# Patient Record
Sex: MALE | Race: BLACK OR AFRICAN AMERICAN | Employment: STUDENT | URBAN - METROPOLITAN AREA
[De-identification: names, ages, dates, MRNs, and addresses within clinical notes are randomized per-mention and may not be internally consistent; named-entity substitution may affect disease eponyms.]

---

## 2017-03-30 ENCOUNTER — TELEPHONE (OUTPATIENT)
Dept: INFUSION THERAPY | Facility: CLINIC | Age: 27
End: 2017-03-30

## 2017-03-30 NOTE — TELEPHONE ENCOUNTER
Patient's mother left message on [facility] triage line asking for Darya House to follow up about testing and discussion with Dr. Hernandez. I sent a staff message to Darya House and left message with family that Darya is out until next week.

## 2017-04-24 ENCOUNTER — TELEPHONE (OUTPATIENT)
Dept: PEDIATRIC HEMATOLOGY/ONCOLOGY | Facility: CLINIC | Age: 27
End: 2017-04-24

## 2017-04-24 NOTE — TELEPHONE ENCOUNTER
E-mail from patient/caregiver:  -----Original Message-----  From: Guy [[email or web address]]   Sent: Friday, April 21, 2017 3:52 PM  To: Susan House  Subject: Yevgeniy Thornton     Dear    Darya Malik is Yevgeniy Thornton. I have a couple questions. 1. Do you happen to know when my next appointment is? 2. I was suppose to get another test done for my neuro-psych. How do I get that done. Please answer ASAP. I need another neuro-psych test done as soon as possible.  Thanks   Yevgeniy    Reply to patient/caregiver:  From: Susan House   Sent: Monday, April 24, 2017 4:46 PM  To: 'Guy'  Subject: RE: Yevgeniy ToddKenna     Hi Yevgeniy,     I think your mom left a message for me and I don't think I called her back - please tell her I m sorry about that.     Your next appointment with Dr. Hernandez is on Tuesday June 6th at 3:15 pm in the [facility] ([address] [place] [address]. 9th Floor   Phone: [phone]).     To schedule an appointment for follow-up neuropsych testing, you can call Dr. Radha Hammond directly: [phone]. If you get her voicemail, please leave a message for her to call you back. I sent Dr. Hammond a message letting her know that you ll be calling her to schedule follow-up testing, and that you d like to see her as soon as possible.    I will also try to call you.     Thank you,    Darya House RN, MS  Care Coordinator, Neurofibromatosis Program  [email or web address]   Clinic/Appointments: [phone]  Nurse Triage: [phone]  Pager: [phone]  Fax: [phone]  E-mail: [email or web address]?    Telephone call to patient/mother:  Called preferred phone number on file for patient; his mother, Shwetha Thornton, answered the phone.  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 Hernandez wanted him to have another evaluation prior to this transition. Yevgeniy has been accepted to 3 local schools: [employer], [employer] and [employer].   Mother stated that it's probably best to call her regarding appt scheduling, as she's the one who drives Yevgeniy to his appts.    Action/Plan:  Message sent to Radha Hammond, PhD, LP regarding patient's request for follow-up testing.     Susan House RN, MS  Neurofibromatosis Care Coordinator  Pager: [phone]                    Rafael Garcia,     You saw Guy for NP testing in 2012. He sent me an e-mail last Friday asking for follow-up testing - he said he needs it asap. He didn't give a reason for his sense of urgency, but I   I gave Yevgeniy your phone number to call you directly to schedule an appointment, but I also told him I would send you a message so you know he'll be calling.     His mom said it makes the most sense to call her to schedule the appt, since she's the one who drives him to his appts.   Shwetha's cell: [phone]    Thank you,   Darya House RN, MS  NF Care Coordinator  Clinic: [phone]  Triage: [phone]  Pager: [phone]  Fax: [phone]  Email: [email or web address]

## 2017-05-10 ENCOUNTER — OFFICE VISIT (OUTPATIENT)
Dept: NEUROPSYCHOLOGY | Facility: CLINIC | Age: 27
End: 2017-05-10

## 2017-05-10 DIAGNOSIS — Q85.01 NEUROFIBROMATOSIS, TYPE 1 (H): Primary | ICD-10-CM

## 2017-05-10 DIAGNOSIS — F09 MENTAL DISORDER DUE TO GENERAL MEDICAL CONDITION: ICD-10-CM

## 2017-05-10 NOTE — PROGRESS NOTES
The patient was seen for neuropsychological evaluation at the request of Dr. Baldomero Hernandez for the purposes of diagnostic clarification and treatment planning.  3 hours of face-to-face testing were provided by this writer.  Please see Dr. Radha Hammond's report for a full interpretation of the findings.

## 2017-05-10 NOTE — MR AVS SNAPSHOT
After Visit Summary   5/10/2017    Guy Thornton    MRN: [id number]           Patient Information     Date Of Birth          1990        Visit Information        Provider Department      5/10/2017 8:00 AM Radha Hammond, PhD [facility] Neuropsychology        Today's Diagnoses     Neurofibromatosis, type 1 (H)    -  1    Mental disorder due to general medical condition           Follow-ups after your visit        Your next 10 appointments already scheduled     Jun 06, 2017  3:15 PM CDT   Return Visit with Baldomero Hernandez MD   [facility] PEDS NEUR[facility]ROMATOSIS ([facility])    [facility]  9th Floor  [address] [address] [phone]              Who to contact     Please call your clinic at [phone] to:    Ask questions about your health    Make or cancel appointments    Discuss your medicines    Learn about your test results    Speak to your doctor   If you have compliments or concerns about an experience at your clinic, or if you wish to file a complaint, please contact [facility] Patient Relations at [phone] or email us at [email or web address]         Additional Information About Your Visit        MyChart Information     [employer] gives you secure access to your electronic health record. If you see a primary care provider, you can also send messages to your care team and make appointments. If you have questions, please call your primary care clinic.  If you do not have a primary care provider, please call [phone] and they will assist you.      [employer] is an electronic gateway that provides easy, online access to your medical records. With [employer], you can request a clinic appointment, read your test results, renew a prescription or communicate with your care team.     To access your existing account, please contact your [facility] or call [phone]  for assistance.        Care EveryWhere ID     This is your Care EveryWhere ID. This could be used by other organizations to access your [place] medical records  [id number]         Blood Pressure from Last 3 Encounters:   06/01/16 124/74   09/25/15 110/66   05/20/15 111/81    Weight from Last 3 Encounters:   06/01/16 66.2 kg (145 lb 15.1 oz)   09/25/15 64 kg (141 lb)   05/20/15 64.5 kg (142 lb 3.2 oz)              We Performed the Following     [id number] TESTING, PER HR/PSYCHOLOGIST     NEUROPSYCH TESTING BY [facility]        Primary Care Provider Office Phone # Fax #    Carlos Eduardo Stover [phone]       [facility] [address] [facility] [address]        Thank you!     Thank you for choosing [facility] NEUROPSYCHOLOGY  for your care. Our goal is always to provide you with excellent care. Hearing back from our patients is one way we can continue to improve our services. Please take a few minutes to complete the written survey that you may receive in the mail after your visit with us. Thank you!             Your Updated Medication List - Protect others around you: Learn how to safely use, store and throw away your medicines at www.disposemymeds.org.          This list is accurate as of: 5/10/17 11:59 PM.  Always use your most recent med list.                   Brand Name Dispense Instructions for use    * albuterol 108 (90 BASE) MCG/ACT Inhaler    PROAIR HFA/PROVENTIL HFA/VENTOLIN HFA    1 Inhaler    Inhale 2 puffs into the lungs every 4 hours as needed       * albuterol (2.5 MG/3ML) 0.083% neb solution     75 mL    Take 1 vial (2.5 mg) by nebulization every 6 hours as needed for shortness of breath / dyspnea or wheezing       * methylphenidate 5 MG tablet    RITALIN    60 tablet    Take 1 tablet (5 mg) by mouth 2 times daily in the AM and at lunchtime.       * methylphenidate 5 MG tablet    RITALIN    60 tablet    Take 1 tablet (5 mg) by mouth 2 times daily in the AM and at lunchtime.        * methylphenidate 5 MG tablet    RITALIN    60 tablet    Take 2 tablets (10 mg) by mouth 2 times daily In the AM and at lunchtime.       VITAMIN D PO      Unit amount unknown, takes one tablet PO Q day when remembers       * Notice:  This list has 5 medication(s) that are the same as other medications prescribed for you. Read the directions carefully, and ask your doctor or other care provider to review them with you.

## 2017-05-28 NOTE — PROGRESS NOTES
WAIS-IV    Raw              Age Scaled   Block Design       22   5     Similarities   23    9   Digit Span   19     5   Matrix Reasoning   6     3   Vocabulary   31    9   Arithmetic   11     8   Symbol Search   23    6   Visual Puzzles   7     5   Information   8     7   Coding   47     6   Letter Num. Seq.   19     9   Cancellation   19     3     VCI:  91  NOLAN:  79   WMI:  67  PSI:   75   FSIQ:  80       WIDE RANGE ACHIEVEMENT TEST - 4    Standard  %tile               Grade      Score  Rank                Equiv  Word Reading    74     4             4.6   Sentence Comp.    86     18           8.5   Spelling    86     18            7.2   Math Comp.    83     13             5.7   Reading Comp.   78     7             --     WECHSLER MEMORY SCALE - 3    Raw Score         SS   Information & Orientation  14   Logical Memory  Immed.  36   9   Logical Memory  30 min.  25   10   30 Minute Recognition  24   z=-0.44    STEPHANY COMPLEX FIGURE TEST   Raw Score   T-score %tile  Copy    28     --        <1   Immediate Recall    16.5    29     2   30 Minute Recall   15       25    1   Recognition   21   46     34     [place] VERBAL LEARNING TEST - 2     1            2         3           4         5   5   9   9   9   12    Raw Score             SD  Trials 1-5 Total  44   43   (T)   List A:  Trial 1  5    -1   List A: Trial 5  12   -0.5   List B: Total  5   -1   Short Delay Free Recall  7   -1.5   Short Delay Cued Recall  8   -2   Long Delay Free Recall  9   -1.5   Long Delay Cued Recall  9   -1.5   Semantic Clustering  4   -0.5   Serial Clustering  -0.1   -0.5   % Recall from Primacy  34   0.5   % Recall from Middle  41   0   % Recall from Recency  25   -0.5   Total Learning [place]  1.4   0   Across-Trial Consistency  78   -0.5   Total Repetitions  9   1   Total Intrusions  2   0   Total Recall Discrim  1.8   -1   Recognition Hits  16   0.5   Recognition False Pos  9   3   Recognition Discrim  2.4   -1   Response  Bias  -0.6   -3.5     [place] NAMING TEST  Score     48         T 35   -1.5 %ile  [  47 w/o cues  4 w/phonemic cues]    CONTROLLED ORAL WORD ASSOC TEST  Score     13              z-2.83   <1 %ile    ANIMAL FLUENCY  Score     13              z     -1.65       5      %ile    CLOCK DRAWING  Command  2 /3    Copy   3   /3       TRAIL MAKING TEST         Seconds         Errors              z                    %ile  A    34   0   -0.69  . 25   B    99   0   -2.53  . <1     [place] CARD SORTING TEST  # Categories  4             6-10    %ile  % persev err.         17        T      38           12 %ile    PSYCHOMOTOR TESTS                                        RH                  LH  Grooved Pegboard   93    T    28   113   T   28                                   Drops (  )                  Drops (  )    TEST OF SUSTAINED ATTENTION AND TRACKING   Time  156     Errors  2     WURS:  19  ARIAS DEPRESSION INVENTORY - 2:  1

## 2017-05-28 NOTE — PROGRESS NOTES
NAME: Guy Thornton  MR#: [id number]  YOB: 1990  DATE OF EXAM: 5/10/2017    [facility]  [facility]  [address]  [place]  [address] [phone]    NEUROPSYCHOLOGICAL EVALUATION    RELEVANT HISTORY AND REASON FOR REFERRAL    Guy Thornton is a 26 year old, right handed student with 14 years of formal education. Information was obtained via interview with the patient and his mother, and review of his medical records, including a prior neuropsychological evaluation. 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 He had multiple developmental delays early in life requiring OT, PT, and speech. He did not walk until he was [age] years old. He was diagnosed with neurofibromatosis 1 by a neurologist at 18 to 24 months of age because of delayed developmental milestones, axillary and inguinal freckling, and multiple café au lait lesions. He had learning issues throughout school and had an IEP for assistance with his learning needs. He is planning to transition from community college to a four-year college this fall. He was referred for neuropsychological evaluation by Joe Hernandez M.D., for further categorization of any cognitive difficulties, and to evaluate mood.    Prior records were available for review. A speech-language pathology communication evaluation from 05/09/2002 under the direction of Damari Chase MA, CCC was reviewed.  Results of that evaluation indicated mildly delayed articulatory abilities, moderately delayed receptive and expressive language skills, pragmatic concerns regarding conversational skills and socially appropriate behaviors and voice concerns characterized by a hoarse voice, vocal quality, low pitch and mixed hearing loss. Strengths at that time  included excellent expressive vocabulary and positive disposition.  A letter from his neurologist in [place], CRISTOBAL Fuentes MD, dated 01/23/2012 indicated that he had significant academic difficulties and had an IEP from elementary school all the way through high school.  He had always had an IEP because of his learning disabilities, with significant problems with fine motor abilities and increased tone.  He has had significant problems with his penmanship and problems with his visual processing.  He does much better with oral exams and has problems with bubbling in [employer] test sheets.  He was reported to need a scribe to help him with his exams in completing his exams and he needed extra time for testing.  Given slowed reading speed, it was also thought that he would benefit from audio books.    Mr. Thornton underwent a neuropsychological evaluation under my direction on 11/9/2012. Please refer to the report from that date for full details regarding his history and the findings of the evaluation. Briefly, results indicated overall intellectual functioning falling in the borderline range (WAIS FSIQ = 75), with a significant difference between verbal abilities falling in the average range, and nonverbal abilities, falling in the mildly impaired range. Impairments were noted in complex attentional processing, information and psychomotor processing speed, visual processing, and executive functioning. Language abilities, in contrast, reflected a significant strength overall. Verbal learning and memory fell within normal limits. He denied experiencing significant depressive symptomatology.    CLINICAL INTERVIEW FINDINGS    Upon interview, Mr. Thornton and his mother indicated that since his last evaluation, he has been attending [employer], studying theater, and in fact will be earning his AFA later today. He is thinking of attending [employer] in the [employer], [employer], or [employer] starting later this summer. He stated that he did fairly well in school, earning A's and B's. His mother noted that as a child, he did not often get in trouble at school, but he did have difficulty sitting still. His teachers allowed him to stand up when he needed to. He tended to chew on things, so he was allowed to chew gum at school. He tended to procrastinate. He had academic accommodations including extra time on tests, books on tape, a notetaker, and a scribe since he was not able to stay within the lines on [employer] forms. 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    Mr. Thornton does not believe that he has had any changes in cognition. At times he may forget what others have said, and he sometimes misplaces items. He continues to have difficulty with attention, and has been prescribed a medication for ADHD, which has helped.  He denied difficulty with word finding, comprehension, decision-making, or organization. He lives with his mother, who for the most part manages his finances. He has a checking account and a savings account, and has not always been following through on recording transactions in his register, so his mother is working with him. His mother assists with management of his medications. He has had a 's permit for the last three years, but has never been interested in a  s license. He cooks without difficulty and handles his personal cares independently.    Mr. Thornton has a history of anxiety, but is not currently working with a psychologist or psychiatrist. He described his mood as happy. He is irritable only when he is hungry. He has been sleeping well, 7 to 8 hours a night, and generally feels rested once he gets up in the morning. He has not been napping during the day. His appetite has been good and he has not had any recent weight changes. His interest level is good. His motivation can be low for tasks that he does not want to do. His mother described him as  always itinerary focused, and he wants to be exactly on time. He does a lot of pacing, and runs two or three times a day, for 1 to 2 miles each time. He denied suicidal ideation or any history of attempts to commit suicide. He denied visual or auditory hallucinations. He denied alcohol, tobacco, or illicit drug use.    Mr. Thornton denied any history of seizure, stroke, or head injury resulting loss of consciousness. His balance has never been good. He has problems standing on one foot. He denied unilateral weakness or numbness. He denied tremor. He experiences occasional headaches. His shoulders have been bothering him and are stiff. He has cataracts, which his doctors are watching closely.    PAST MEDICAL HISTORY: Records indicate a history of acquired bilateral deformity of ankles, deformity of toes, hyperlipidemia, hearing loss, intermittent asthma, acne, and neurofibromatosis 1.    CURRENT MEDICATIONS: Include methylphenidate, albuterol, and cholecalciferol.    FAMILY MEDICAL HISTORY:  Notable for a biological mother with borderline personality disorder and younger sister who is also premature and has been diagnosed with problems with reflexes and dizziness, but apparently does not have neurofibromatosis.    BEHAVIORAL OBSERVATIONS    During the evaluation, Mr. Thornton was pleasant, cooperative, and seemed to understand the instructions. He was alert and oriented to person, place, and time. No abnormal movements were observed clinically, other than that he bent very close to the table during writing and drawing tasks. Mood was euthymic. At times he commented that he was nervous, however. Speech was fluent, with normal articulation, volume, and rate. Spontaneous conversation was present and appropriate. Mr. Thornton appeared to put forth consistent effort, and the results are believed to accurately reflect his current functioning.    MEASURES ADMINISTERED    The following measures were administered by a trained  psychometrist, under the direct supervision of a licensed psychologist.    Wechsler Adult Intelligence Scale-4; Wide Range Achievement Test-4; subtests of the Wechsler Memory Scale-3; Michael Complex Figure Test; [place] Verbal Learning Test-2; [place] Naming Test; Controlled Oral Word Association Test; Semantic Fluency; Clock Drawing; Trail Making Test; [place] Card Sorting Test; Grooved Pegboard; Test of Sustained Attention and Tracking; Wender [place] Rating Scale; De Souza Depression Inventory - 2 (BDI-2).     RESULTS AND INTERPRETATION    Overall intellectual functioning fell in the low average range (WAIS-IV FSIQ = 80). Examination of the factors underlying this performance indicate verbal abilities following the average range (VCI = 91), with nonverbal abilities falling in the borderline range (NOLAN = 79), a marginal difference. Working memory fell in the mildly impaired range (WMI = 67), and processing speed fell in the borderline range (PSI = 75).    Single word reading abilities fell in the borderline range, at a fourth grade equivalent. Comprehension of sentences was low average, falling at an eighth grade equivalent. Spelling and mathematical abilities also fell in the low average range.    Confrontation naming was mildly impaired for his age and level of education, and improved marginally with phonemic cues. General fund of verbal knowledge was low average. Verbal abstract reasoning was average. Expressive vocabulary was average. Letter fluency was moderately slowed. Semantic fluency was mildly slowed.    Attention span was mildly impaired for his age. Mental arithmetic was low average. Divided attention was moderately impaired on a timed, visual motor sequencing task, but notably, was average on an untimed, auditory sequencing task. Performance on a measure of sustained attention was moderately impaired. Psychomotor processing speed was mildly slowed. Visual search and scanning was mildly slowed. Finger  "tapping speed was moderately impaired bilaterally.    Construction of a clock to command was notable for difficulty with conceptualization. When asked to set the hands to 11:10, he instead set them to 10:55. Copy of a clock fell within normal limits. Construction of a complex design was moderately impaired, and was characterized by missing details, and difficulty with planning and organization. Assembly of visual material was mildly impaired. Visual problem-solving was mildly impaired. Nonverbal deductive reasoning was moderately impaired.    Novel problem-solving, including the ability to generate strategies and solutions, was mildly impaired for his age and level of education. On this task, he demonstrated mild difficulty with conceptualization.    Immediate and 30 minute delayed recall of verbal narrative material was average. On a multiple trial list learning task, immediate recall was average, with mildly impaired recall following a 20 minute delay. Recognition memory on this task was low average overall, but was notable for multiple false positive identifications of words. Immediate and 30 minute delayed recall of visual material was moderately impaired, with average recognition memory on this task.    On the WURS, a self-report questionnaire, Mr. Thornton scored below a cutoff suggestive of childhood symptoms of ADHD. Specifically, under  very much,  he indicated that as a child, he was nervous and fidgety, acted without thinking and was impulsive, and had trouble with math or numbers. Under \"quite a bit,\" he indicated that he had concentration problems and was easily distracted, and was inattentive. Under  mildly,  he indicated that he had temper outbursts or tantrums.    On the BDI-2, self-report questionnaire, Mr. Thornton denied experiencing depressive symptomatology, acknowledging only that he feels more restless or wound up than usual.    IMPRESSIONS AND RECOMMENDATIONS    Current results indicate overall " intellectual functioning falling in the low average range (WAIS-IV FSIQ = 80), with a marginal difference between verbal abilities, falling in the average range (VCI = 91), and nonverbal abilities, falling in the borderline range (NOLAN = 79). Working memory was mildly impaired, and processing speed was borderline impaired. Attention was impaired overall, although there was some variability, ranging from moderately impaired on several tasks to average on one task. There was mild executive dysfunction, including difficulty with problem solving, planning, organization, and conceptualization. Visual processing, including visual construction and visuospatial abilities, was impaired. He had difficulty with retrieval of information, but did best when information was presented in context. Motor functioning was impaired bilaterally, and processing speed was slowed. Language abilities continue to reflect a strength, in many cases falling in the average range. Basic academic abilities, including single word reading abilities, sentence comprehension, spelling, and mathematical computation fell in the borderline to low average range. He denied significant depressive symptomatology.    Compared to his evaluation on 11/9/2012, he has had declines in working memory and information processing speed. He has had improvements in some aspects of attention. Performance otherwise remains stable across cognitive domains.    This pattern of performance continues to suggest diffuse cerebral involvement, with a suggestion of greater nondominant hemisphere cerebral involvement. This pattern has been generally stable over the last five years, and is consistent with narrative descriptions of evaluations from childhood which were available for review. The findings are therefore consistent with the lifelong developmental disorder. On a self-report questionnaire, his score fell below a cutoff suggestive of ADHD. His mother indicated, however, that  he had trouble sitting still as a child and as an adult, he had trouble paying attention and tended to procrastinate. Although not formally evaluated for ADHD, on this evaluation, he demonstrated difficulty with attention as well as executive functioning, findings which are consistent with ADHD, a common comorbidity of NF1.     In terms academic functioning, Mr. Thornton will likely continue to benefit from academic accommodations which have been provided to him throughout his academic career. These could include, but may not be limited to extended time to complete assignments and examinations, given his slowed processing speed, as well as a  in view of impaired fine manual dexterity and slowed processing speed. As noted at his prior evaluation, he will with likely do particularly well with classes that emphasizes strengths in language abilities and verbal memory. He has a particular interest in theater, which would capitalize on these strengths. As recommended at his prior evaluation, a reduced course load seems reasonable so he does not become overwhelmed with completing his assignments, given slowed processing speed and other cognitive difficulties.    In terms of daily functioning, Mr. Thornton may find that he has difficulty organizing large, complex tasks. Others may assist by breaking down such tasks into smaller, more manageable parts. His mother has been assisting him with his finances, and he may benefit from some specific training in methods to budget and manage finances. He will likely do well with structure and routine. If it takes him longer to complete tasks, he may find it helpful to provide himself with ample time so that he does not become overwhelmed and work less efficiently. In general, he will likely learn information best when it is presented verbally, and also when it is presented in context. These results have not been discussed with Mr. Thornton or his family, although he was  encouraged to contact my office to schedule appointment for feedback should he so desire.      Radha Hammond, Ph.D., ABPP  Licensed Psychologist, [id number]  Board Certified in Clinical Neuropsychology    Time spent:  Four hours professional time, including interview, record review, data integration, and report writing (CPT 96366); an additional three hours, including testing administered by a psychometrist and interpreted by a neuropsychologist (CPT 55516). ICD-10 diagnosis:  Q85.01; F06.8.

## 2017-06-06 ENCOUNTER — OFFICE VISIT (OUTPATIENT)
Dept: PEDIATRIC HEMATOLOGY/ONCOLOGY | Facility: CLINIC | Age: 27
End: 2017-06-06
Attending: PEDIATRICS
Payer: COMMERCIAL

## 2017-06-06 VITALS
DIASTOLIC BLOOD PRESSURE: 84 MMHG | SYSTOLIC BLOOD PRESSURE: 130 MMHG | RESPIRATION RATE: 16 BRPM | BODY MASS INDEX: 23.29 KG/M2 | HEIGHT: 67 IN | HEART RATE: 96 BPM | WEIGHT: 148.37 LBS

## 2017-06-06 DIAGNOSIS — Q85.01 NEUROFIBROMATOSIS, TYPE 1 (VON RECKLINGHAUSEN'S DISEASE) (H): Primary | ICD-10-CM

## 2017-06-06 DIAGNOSIS — H26.9: ICD-10-CM

## 2017-06-06 DIAGNOSIS — J45.20 INTERMITTENT ASTHMA, UNCOMPLICATED: ICD-10-CM

## 2017-06-06 PROCEDURE — 99213 OFFICE O/P EST LOW 20 MIN: CPT | Mod: ZF

## 2017-06-06 ASSESSMENT — PAIN SCALES - GENERAL: PAINLEVEL: MODERATE PAIN (5)

## 2017-06-06 NOTE — MR AVS SNAPSHOT
After Visit Summary   6/6/2017    Guy Thornton    MRN: [id number]           Patient Information     Date Of Birth          1990        Visit Information        Provider Department      6/6/2017 3:15 PM Baldomero Hernandez MD [facility] PEDS NEUROFIBROMATOSIS        Today's Diagnoses     Neurofibromatosis, type 1 (von Recklinghausen's disease) (H)    -  1    Cataract, anterior subcapsular polar nonsenile; left        Intermittent asthma, uncomplicated          Care Instructions    Neurofibromatosis (NF) Clinic  [facility], 9th Floor - [address] [address]  Scheduling/Appointments: [phone]  Fax: [phone]    Numbers to call:   Monday - Friday, 8:00 am - 5:00 pm:    Non-urgent or same-day call-back concerns: [facility] Nurse Triage - Voicemail: [phone]    Urgent concerns: NF Care Coordinator - Darya House RN - Pager: [phone]    Scheduling/Appointments: [phone]  Nights and weekends:   Call [phone] and ask the  to page the 'Pediatric Heme/Onc fellow on call' if you have an urgent concern that can't wait until the clinic opens.  Genetic Counselors:  Alycia Valencia: [phone]   Blanca Combs: [phone]    Darya House RN, MS  NF Care Coordinator  Pager: [phone]  E-mail: [email or web address]    --------------------------------------------------------------------------------------------------------------------------------    It was a pleasure to see you in our Neurofibromatosis clinic today.    Here's our recommendations for follow-up care:    Referrals:  Ophthalmology - Dr. Barr at  [facility]    Other Instructions:  Cervical spine MRI - within the month.    Return to Clinic:  After MRI to review results.            Follow-ups after your visit        Additional Services     OPHTHALMOLOGY ADULT REFERRAL       Your provider has referred you to:  [facility]: [facility] [phone]   [email or web address]  Dr. Barr - follow-up NF1 and cataracts.    Please be aware that coverage of these services is subject to the terms and limitations of your health insurance plan.  Call member services at your health plan with any benefit or coverage questions.      Please bring the following to your appointment:  >>   Any x-rays, CTs or MRIs which have been performed.  Contact the facility where they were done to arrange for  prior to your scheduled appointment.  Any new CT, MRI or other procedures ordered by your specialist must be performed at a [facility] or coordinated by your clinic's referral office.    >>   List of current medications   >>   This referral request   >>   Any documents/labs given to you for this referral                  Follow-up notes from your care team     Return in about 2 weeks (around 6/21/2017) for Imaging, NF1 Follow-Up, Hem/Onc Clinic.      Your next 10 appointments already scheduled     Jun 28, 2017  7:00 AM CDT   MR CERVICAL SPINE W/O & W CONTRAST with URMR2   [facility], MRI ([facility])    [address] [place] [address] [phone]           Take your medicines as usual, unless your doctor tells you not to. Bring a list of your current medicines to your exam (including vitamins, minerals and over-the-counter drugs).  You will be given intravenous contrast for this exam. To prepare:   The day before your exam, drink extra fluids at least six 8-ounce glasses (unless your doctor tells you to restrict your fluids).   Have a blood test (creatinine test) within 30 days of your exam. Go to your clinic or Diagnostic Imaging Department for this test.  The MRI machine uses a strong magnet. Please wear clothes without metal (snaps, zippers). A sweatsuit works well, or we may give you a hospital gown.  Please remove any body piercings and hair extensions  before you arrive. You will also remove watches, jewelry, hairpins, wallets, dentures, partial dental plates and hearing aids. You may wear contact lenses, and you may be able to wear your rings. We have a safe place to keep your personal items, but it is safer to leave them at home.   **IMPORTANT** THE INSTRUCTIONS BELOW ARE ONLY FOR THOSE PATIENTS WHO HAVE BEEN TOLD THEY WILL RECEIVE SEDATION OR GENERAL ANESTHESIA DURING THEIR MRI PROCEDURE:  IF YOU WILL RECEIVE SEDATION (take medicine to help you relax during your exam):   You must get the medicine from your doctor before you arrive. Bring the medicine to the exam. Do not take it at home.   Arrive one hour early. Bring someone who can take you home after the test. Your medicine will make you sleepy. After the exam, you may not drive, take a bus or take a taxi by yourself.   No eating 8 hours before your exam. You may have clear liquids up until 4 hours before your exam. (Clear liquids include water, clear tea, black coffee and fruit juice without pulp.)  IF YOU WILL RECEIVE ANESTHESIA (be asleep for your exam):   Arrive 1 1/2 hours early. Bring someone who can take you home after the test. You may not drive, take a bus or take a taxi by yourself.   No eating 8 hours before your exam. You may have clear liquids up until 4 hours before your exam. (Clear liquids include water, clear tea, black coffee and fruit juice without pulp.)  Please call the Imaging Department at your exam site with any questions.            Jun 28, 2017  9:00 AM CDT   Return Visit with Baldomero Hernandez MD   Peds Hematology Oncology ([facility])    [facility]  9th Floor  [address] [facility] [address] [phone]              Who to contact     Please call your clinic at [phone] to:    Ask questions about your health    Make or cancel appointments    Discuss your medicines    Learn about your test results    Speak to your doctor   If you  "have compliments or concerns about an experience at your clinic, or if you wish to file a complaint, please contact [facility] Patient Relations at [phone] or email us at [email or web address]         Additional Information About Your Visit        MyChart Information     [employer] gives you secure access to your electronic health record. If you see a primary care provider, you can also send messages to your care team and make appointments. If you have questions, please call your primary care clinic.  If you do not have a primary care provider, please call [phone] and they will assist you.      [facility] is an electronic gateway that provides easy, online access to your medical records. With [facility], you can request a clinic appointment, read your test results, renew a prescription or communicate with your care team.     To access your existing account, please contact your [facility] or call [phone] for assistance.        Care EveryWhere ID     This is your Care EveryWhere ID. This could be used by other organizations to access your [place] medical records  [id number]        Your Vitals Were     Pulse Respirations Height BMI (Body Mass Index)          96 16 1.703 m (5' 7.05\") 23.21 kg/m2         Blood Pressure from Last 3 Encounters:   06/06/17 130/84   06/01/16 124/74   09/25/15 110/66    Weight from Last 3 Encounters:   06/06/17 67.3 kg (148 lb 5.9 oz)   06/01/16 66.2 kg (145 lb 15.1 oz)   09/25/15 64 kg (141 lb)               Primary Care Provider Office Phone # Fax #    Sonny Amandeep Lr [phone]       [address] [address]        Thank you!     [facility]  [facility] your [facility]. Our goal is always to provide you with excellent care. Hearing back from our patients is one way we can continue to improve our services. Please take a few minutes to " complete the written survey that you may receive in the mail after your visit with us. Thank you!             Your Updated Medication List - Protect others around you: Learn how to safely use, store and throw away your medicines at www.disposemymeds.org.          This list is accurate as of: 6/6/17 11:59 PM.  Always use your most recent med list.                   Brand Name Dispense Instructions for use    * albuterol 108 (90 BASE) MCG/ACT Inhaler    PROAIR HFA/PROVENTIL HFA/VENTOLIN HFA    1 Inhaler    Inhale 2 puffs into the lungs every 4 hours as needed       * albuterol (2.5 MG/3ML) 0.083% neb solution     75 mL    Take 1 vial (2.5 mg) by nebulization every 6 hours as needed for shortness of breath / dyspnea or wheezing       * methylphenidate 5 MG tablet    RITALIN    60 tablet    Take 1 tablet (5 mg) by mouth 2 times daily in the AM and at lunchtime.       * methylphenidate 5 MG tablet    RITALIN    60 tablet    Take 1 tablet (5 mg) by mouth 2 times daily in the AM and at lunchtime.       * methylphenidate 5 MG tablet    RITALIN    60 tablet    Take 2 tablets (10 mg) by mouth 2 times daily In the AM and at lunchtime.       VITAMIN D PO      Unit amount unknown, takes one tablet PO Q day when remembers       * Notice:  This list has 5 medication(s) that are the same as other medications prescribed for you. Read the directions carefully, and ask your doctor or other care provider to review them with you.

## 2017-06-06 NOTE — LETTER
"  6/6/2017      RE: Guy Thornton  [address]  [place] [address]            Pediatric Hematology/Oncology Clinic Note     HPI-  Guy Thornton is a 26 year old male with NF who presents to the clinic with mom for a follow up.  Guy just graduated from high school with honors and is going to [employer] for college.He is going to take a [employer] language class.   Yevgeniy got a causey retriever puppy recently.  Yevgeniy has had a neuropsychological testing done and wanted to review the results. Working memory and processing speed was found to be borderline, He has good verbal memory and language abilities.  Guy endorses shoulder pain        History was obtained from Guy and his mom.       Allergies   Allergen Reactions     Bacitracin Blisters     Chloral Hydrate Other (See Comments)     Patient becomes aggressive and hallucinations       Current Outpatient Prescriptions   Medication     methylphenidate (RITALIN) 5 MG tablet     albuterol (2.5 MG/3ML) 0.083% nebulizer solution     albuterol (PROAIR HFA, PROVENTIL HFA, VENTOLIN HFA) 108 (90 BASE) MCG/ACT inhaler     methylphenidate (RITALIN) 5 MG tablet     methylphenidate (RITALIN) 5 MG tablet     Cholecalciferol (VITAMIN D PO)     No current facility-administered medications for this visit.        Past Medical History:   Diagnosis Date     Asthma      Hearing loss of left ear      Neurofibromatosis (H)        Past Surgical History:   Procedure Laterality Date     BACK SURGERY       ear surgeries      3 tympanomastoidectomies       Family History   Problem Relation Age of Onset     Unknown/Adopted Mother      Unknown/Adopted Father      Unknown/Adopted Maternal Grandmother      Unknown/Adopted Maternal Grandfather        Review of Systems    /84 (BP Location: Left arm, Patient Position: Chair, Cuff Size: Adult Regular)  Pulse 96  Resp 16  Ht 1.703 m (5' 7.05\")  Wt 67.3 kg (148 lb 5.9 oz)  BMI 23.21 kg/m2  Physical Exam      Results for " orders placed or performed in visit on 06/01/16   XR Spine Complete 2 Views    Narrative    Exam: Complete spine radiograph on 6/1/2016.    History: Rule out scoliosis, NF1.    Comparison: 9/21/2012.    Findings: AP and lateral views of the entire spine were obtained.    There is subtle S. Shaped scoliosis of the thoracic and lumbar spine,  levocurvature in the thoracic spine and dextrocurvature in the lumbar  spine measuring less than 10 degrees. There is mild pelvic tilt with  the right iliac crest slightly higher than the left. There is minimal  rightward coronal imbalance. There is is mild negative sagittal  imbalance. No vertebral body anomaly is identified. Straightening of  the cervical lordosis, which may be positional. Normal thoracic  kyphosis and mild exaggerated lumbar lordosis.     Cardiac silhouette and pulmonary vasculature are within normal limits.  No pneumothorax, pleural effusion or focal airspace opacities.  Nonobstructive bowel gas pattern.      Impression    Impression:  Mild S. shaped scoliotic curvature of the thoracolumbar  spine, similar to prior. Negative sagittal imbalance, slightly  progressed since prior study. Minimal pelvic tilt, not significant  changed.    I have personally reviewed the examination and initial interpretation  and I agree with the findings.    BIANCA CHRISTIANSON MD         Impression:  1. NF  2. Shoulder pain    Plan:  1. MRI brachial plexus  2. ***    Time spent with patient 40 minutes.    This document serves as a record of the services and decisions personally performed and made by Baldomero Hernandez MD. It was created on his behalf by a medical student..    The documentation recorded by the medical student accurately reflects the services I personally performed and the decisions made by me.      Baldomero Hernandez    CC  Patient Care Team:  Sonny Lr as PCP - General (Internal Medicine)  Susan House, RN as Continuity Care Coordinator  (Pediatric Hematology/Oncology)  Baldomero Hernandez MD as Referring Physician (Pediatric Hematology/Oncology)  Daphne Egan APRN CNP as Nurse Practitioner (Pediatric Hematology/Oncology)  Radha Hammond, PhD LP (Psychology)  DUSTIN MCKNIGHT    Copy to patient  GUY PATINO  [address]  [address]  [place] [address]       Pediatric Hematology/Oncology Clinic Note    CC: Guy Patino is a 26 year old male with NF1 who presents to clinic with his mother for a follow up evaluation.    HPI: Guy reports that he is doing well overall. He just graduated from [employer] with honors and is going to [employer] in [place]. He will also be taking a sign language class.  Yevgeniy recently got a causey retriever puppy.  Yevgeniy has had a neuropsychological evaluation done and wanted to review the results. Working memory and processing speed was found to be borderline, he has a good verbal memory and language abilities.   Yevgeniy complains about shoulder pain.       Fam/Soc. No new family history. Yevgeniy is attending [employer] and doing well.    History was obtained from Guy and his mother.    Allergies as of 06/06/2017 - Noel as Reviewed 06/06/2017   Allergen Reaction Noted     Bacitracin Blisters 09/21/2012     Chloral hydrate Other (See Comments) 09/21/2012       Current Outpatient Prescriptions   Medication Sig Dispense Refill     methylphenidate (RITALIN) 5 MG tablet Take 2 tablets (10 mg) by mouth 2 times daily In the AM and at lunchtime. 60 tablet 0     albuterol (2.5 MG/3ML) 0.083% nebulizer solution Take 1 vial (2.5 mg) by nebulization every 6 hours as needed for shortness of breath / dyspnea or wheezing 75 mL 0     albuterol (PROAIR HFA, PROVENTIL HFA, VENTOLIN HFA) 108 (90 BASE) MCG/ACT inhaler Inhale 2 puffs into the lungs every 4 hours as needed 1 Inhaler 2     methylphenidate (RITALIN) 5 MG tablet Take 1 tablet (5 mg) by mouth 2 times daily in the  "AM and at lunchtime. 60 tablet 0     methylphenidate (RITALIN) 5 MG tablet Take 1 tablet (5 mg) by mouth 2 times daily in the AM and at lunchtime. 60 tablet 0     Cholecalciferol (VITAMIN D PO) Unit amount unknown, takes one tablet PO Q day when remembers         Past Medical History:   Diagnosis Date     Asthma      Hearing loss of left ear      Neurofibromatosis (H)        Social History     Social History     Marital status: Single     Spouse name: N/A     Number of children: N/A     Years of education: N/A     Occupational History     Not on file.     Social History Main Topics     Smoking status: Never Smoker     Smokeless tobacco: Never Used     Alcohol use No     Drug use: No     Sexual activity: No     Other Topics Concern     Blood Transfusions No     Sleep Concern No     Stress Concern No     Weight Concern No     Exercise Yes     Bike Helmet Yes     Seat Belt Yes     Social History Narrative    Lives at home with mother. Goes to [employer] for college, majoring in theatre.        Family History   Problem Relation Age of Onset     Unknown/Adopted Mother      Unknown/Adopted Father      Unknown/Adopted Maternal Grandmother      Unknown/Adopted Maternal Grandfather        Review of Systems   Constitutional: Negative.    HENT: Negative.    Eyes: Negative.    Respiratory: Negative.    Cardiovascular: Negative.    Gastrointestinal: Negative.    Genitourinary: Negative.    Musculoskeletal: Negative.    Skin:        Cafe au lait macules   Neurological: Negative.    Endo/Heme/Allergies: Negative.    Psychiatric/Behavioral: Negative.      See HPI. Complete ROS otherwise negative.    /84 (BP Location: Left arm, Patient Position: Chair, Cuff Size: Adult Regular)  Pulse 96  Resp 16  Ht 1.703 m (5' 7.05\")  Wt 67.3 kg (148 lb 5.9 oz)  BMI 23.21 kg/m2  Physical Exam   Constitutional: He is oriented to person, place, and time and well-developed, well-nourished, and in no distress.   HENT:   Mouth/Throat: " Oropharynx is clear and moist.   Eyes: Conjunctivae and EOM are normal.   Neck: Normal range of motion.   Cardiovascular: Normal rate, regular rhythm and normal heart sounds.    Pulmonary/Chest: Effort normal and breath sounds normal.   Abdominal: Soft.   Musculoskeletal: Normal range of motion. He exhibits no edema.   Neurological: He is alert and oriented to person, place, and time.   Negative Romberg.    Skin: Skin is warm and dry.   Psychiatric: Mood and affect normal.       Impression:  1. NF1  2. Left cataract - not NF1 related  3. Shoulder and neck pain      Plan:    1. MRI cervical spine/ brachial plexus  2. RTC for review of imaging.      Baldomero Hernandez MD

## 2017-06-06 NOTE — LETTER
6/6/2017      RE: Guy Thornton  [address]  [address]  [place] [address]          Pediatric Hematology/Oncology Clinic Note    CC: Guy Thornton is a 26 year old male with NF1 who presents to clinic with his mother for a follow up evaluation.    HPI: Guy reports that he is doing well overall. He just graduated from [employer] with honors and is going to [employer] in [place]. He will also be taking a sign language class.  Yevgeniy recently got a causey retriever puppy.  Yevgeniy has had a neuropsychological evaluation done and wanted to review the results. Working memory and processing speed was found to be borderline, he has a good verbal memory and language abilities.   Yevgeniy complains about shoulder pain.       Fam/Soc. No new family history. Yevgeniy is attending [employer] and doing well.    History was obtained from Guy and his mother.    Allergies as of 06/06/2017 - Noel as Reviewed 06/06/2017   Allergen Reaction Noted     Bacitracin Blisters 09/21/2012     Chloral hydrate Other (See Comments) 09/21/2012       Current Outpatient Prescriptions   Medication Sig Dispense Refill     methylphenidate (RITALIN) 5 MG tablet Take 2 tablets (10 mg) by mouth 2 times daily In the AM and at lunchtime. 60 tablet 0     albuterol (2.5 MG/3ML) 0.083% nebulizer solution Take 1 vial (2.5 mg) by nebulization every 6 hours as needed for shortness of breath / dyspnea or wheezing 75 mL 0     albuterol (PROAIR HFA, PROVENTIL HFA, VENTOLIN HFA) 108 (90 BASE) MCG/ACT inhaler Inhale 2 puffs into the lungs every 4 hours as needed 1 Inhaler 2     methylphenidate (RITALIN) 5 MG tablet Take 1 tablet (5 mg) by mouth 2 times daily in the AM and at lunchtime. 60 tablet 0     methylphenidate (RITALIN) 5 MG tablet Take 1 tablet (5 mg) by mouth 2 times daily in the AM and at lunchtime. 60 tablet 0     Cholecalciferol (VITAMIN D PO) Unit amount unknown, takes one tablet PO Q day when remembers    "      Past Medical History:   Diagnosis Date     Asthma      Hearing loss of left ear      Neurofibromatosis (H)        Social History     Social History     Marital status: Single     Spouse name: N/A     Number of children: N/A     Years of education: N/A     Occupational History     Not on file.     Social History Main Topics     Smoking status: Never Smoker     Smokeless tobacco: Never Used     Alcohol use No     Drug use: No     Sexual activity: No     Other Topics Concern     Blood Transfusions No     Sleep Concern No     Stress Concern No     Weight Concern No     Exercise Yes     Bike Helmet Yes     Seat Belt Yes     Social History Narrative    Lives at home with mother. Goes to [email or web address] for college, majoring in [employer].        Family History   Problem Relation Age of Onset     Unknown/Adopted Mother      Unknown/Adopted Father      Unknown/Adopted Maternal Grandmother      Unknown/Adopted Maternal Grandfather        Review of Systems   Constitutional: Negative.    HENT: Negative.    Eyes: Negative.    Respiratory: Negative.    Cardiovascular: Negative.    Gastrointestinal: Negative.    Genitourinary: Negative.    Musculoskeletal: Negative.    Skin:        Cafe au lait macules   Neurological: Negative.    Endo/Heme/Allergies: Negative.    Psychiatric/Behavioral: Negative.      See HPI. Complete ROS otherwise negative.    /84 (BP Location: Left arm, Patient Position: Chair, Cuff Size: Adult Regular)  Pulse 96  Resp 16  Ht 1.703 m (5' 7.05\")  Wt 67.3 kg (148 lb 5.9 oz)  BMI 23.21 kg/m2  Physical Exam   Constitutional: He is oriented to person, place, and time and well-developed, well-nourished, and in no distress.   HENT:   Mouth/Throat: Oropharynx is clear and moist.   Eyes: Conjunctivae and EOM are normal.   Neck: Normal range of motion.   Cardiovascular: Normal rate, regular rhythm and normal heart sounds.    Pulmonary/Chest: Effort normal and breath sounds normal.   Abdominal: Soft. "   Musculoskeletal: Normal range of motion. He exhibits no edema.   Neurological: He is alert and oriented to person, place, and time.   Negative Romberg.    Skin: Skin is warm and dry.   Psychiatric: Mood and affect normal.       Impression:  1. NF1  2. Left cataract - not NF1 related  3. Shoulder and neck pain      Plan:    1. MRI cervical spine/ brachial plexus  2. RTC for review of imaging.      Baldomero Hernandez MD

## 2017-06-06 NOTE — LETTER
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    HPI: Guy reports that he is doing well overall. He just graduated from [employer] with honors and is going to [employer] in [place]. He will also be taking a sign language class.  Yevgeniy recently got a causey retriever puppy.  Yevgeniy has had a neuropsychological evaluation done and wanted to review the results. Working memory and processing speed was found to be borderline, he has a good verbal memory and language abilities.   Yevgeniy complains about shoulder pain.       Fam/Soc. No new family history. Yevgeniy is attending [employer] and doing well.    History was obtained from Guy and his mother.    Allergies as of 06/06/2017 - Noel as Reviewed 06/06/2017   Allergen Reaction Noted     Bacitracin Blisters 09/21/2012     Chloral hydrate Other (See Comments) 09/21/2012       Current Outpatient Prescriptions   Medication Sig Dispense Refill     methylphenidate (RITALIN) 5 MG tablet Take 2 tablets (10 mg) by mouth 2 times daily In the AM and at lunchtime. 60 tablet 0     albuterol (2.5 MG/3ML) 0.083% nebulizer solution Take 1 vial (2.5 mg) by nebulization every 6 hours as needed for shortness of breath / dyspnea or wheezing 75 mL 0     albuterol (PROAIR HFA, PROVENTIL HFA, VENTOLIN HFA) 108 (90 BASE) MCG/ACT inhaler Inhale 2 puffs into the lungs every 4 hours as needed 1 Inhaler 2     methylphenidate (RITALIN) 5 MG tablet Take 1 tablet (5 mg) by mouth 2 times daily in the AM and at lunchtime. 60 tablet 0     methylphenidate (RITALIN) 5 MG tablet Take 1 tablet (5 mg) by mouth 2 times daily in the AM and at lunchtime. 60 tablet 0     Cholecalciferol (VITAMIN D PO) Unit amount unknown, takes one tablet PO Q day when remembers    "      Past Medical History:   Diagnosis Date     Asthma      Hearing loss of left ear      Neurofibromatosis (H)        Social History     Social History     Marital status: Single     Spouse name: N/A     Number of children: N/A     Years of education: N/A     Occupational History     Not on file.     Social History Main Topics     Smoking status: Never Smoker     Smokeless tobacco: Never Used     Alcohol use No     Drug use: No     Sexual activity: No     Other Topics Concern     Blood Transfusions No     Sleep Concern No     Stress Concern No     Weight Concern No     Exercise Yes     Bike Helmet Yes     Seat Belt Yes     Social History Narrative    Lives at home with mother. Goes to [email or web address] for college, majoring in [employer].        Family History   Problem Relation Age of Onset     Unknown/Adopted Mother      Unknown/Adopted Father      Unknown/Adopted Maternal Grandmother      Unknown/Adopted Maternal Grandfather        Review of Systems   Constitutional: Negative.    HENT: Negative.    Eyes: Negative.    Respiratory: Negative.    Cardiovascular: Negative.    Gastrointestinal: Negative.    Genitourinary: Negative.    Musculoskeletal: Negative.    Skin:        Cafe au lait macules   Neurological: Negative.    Endo/Heme/Allergies: Negative.    Psychiatric/Behavioral: Negative.      See HPI. Complete ROS otherwise negative.    /84 (BP Location: Left arm, Patient Position: Chair, Cuff Size: Adult Regular)  Pulse 96  Resp 16  Ht 1.703 m (5' 7.05\")  Wt 67.3 kg (148 lb 5.9 oz)  BMI 23.21 kg/m2  Physical Exam   Constitutional: He is oriented to person, place, and time and well-developed, well-nourished, and in no distress.   HENT:   Mouth/Throat: Oropharynx is clear and moist.   Eyes: Conjunctivae and EOM are normal.   Neck: Normal range of motion.   Cardiovascular: Normal rate, regular rhythm and normal heart sounds.    Pulmonary/Chest: Effort normal and breath sounds normal.   Abdominal: Soft. "   Musculoskeletal: Normal range of motion. He exhibits no edema.   Neurological: He is alert and oriented to person, place, and time.   Negative Romberg.    Skin: Skin is warm and dry.   Psychiatric: Mood and affect normal.       Impression:  1. NF1  2. Left cataract - not NF1 related  3. Shoulder and neck pain      Plan:    1. MRI cervical spine/ brachial plexus  2. RTC for review of imaging.          Baldomero Hernandez MD

## 2017-06-06 NOTE — NURSING NOTE
"Chief Complaint   Patient presents with     RECHECK     Patient here for Neurofibromatosis, peripheral, NF 1 follow up     /84 (BP Location: Left arm, Patient Position: Chair, Cuff Size: Adult Regular)  Pulse 96  Resp 16  Ht 1.703 m (5' 7.05\")  Wt 67.3 kg (148 lb 5.9 oz)  BMI 23.21 kg/m2  Toyin Collins  June 6, 2017    "

## 2017-06-07 NOTE — PROGRESS NOTES
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    HPI: Guy reports that he is doing well overall. He just graduated from [employer] with honors and is going to [employer] in [place]. He will also be taking a sign language class.  Yevgeniy recently got a causey retriever puppy.  Yevgeniy has had a neuropsychological evaluation done and wanted to review the results. Working memory and processing speed was found to be borderline, he has a good verbal memory and language abilities.   Yevgeniy complains about shoulder pain.       Fam/Soc. No new family history. Yevgeniy is attending [employer] and doing well.    History was obtained from Guy and his mother.    Allergies as of 06/06/2017 - Noel as Reviewed 06/06/2017   Allergen Reaction Noted     Bacitracin Blisters 09/21/2012     Chloral hydrate Other (See Comments) 09/21/2012       Current Outpatient Prescriptions   Medication Sig Dispense Refill     methylphenidate (RITALIN) 5 MG tablet Take 2 tablets (10 mg) by mouth 2 times daily In the AM and at lunchtime. 60 tablet 0     albuterol (2.5 MG/3ML) 0.083% nebulizer solution Take 1 vial (2.5 mg) by nebulization every 6 hours as needed for shortness of breath / dyspnea or wheezing 75 mL 0     albuterol (PROAIR HFA, PROVENTIL HFA, VENTOLIN HFA) 108 (90 BASE) MCG/ACT inhaler Inhale 2 puffs into the lungs every 4 hours as needed 1 Inhaler 2     methylphenidate (RITALIN) 5 MG tablet Take 1 tablet (5 mg) by mouth 2 times daily in the AM and at lunchtime. 60 tablet 0     methylphenidate (RITALIN) 5 MG tablet Take 1 tablet (5 mg) by mouth 2 times daily in the AM and at lunchtime. 60 tablet 0     Cholecalciferol (VITAMIN D PO) Unit amount unknown, takes one tablet PO Q day when remembers         Past Medical History:   Diagnosis Date     Asthma      Hearing loss of left ear       "Neurofibromatosis (H)        Social History     Social History     Marital status: Single     Spouse name: N/A     Number of children: N/A     Years of education: N/A     Occupational History     Not on file.     Social History Main Topics     Smoking status: Never Smoker     Smokeless tobacco: Never Used     Alcohol use No     Drug use: No     Sexual activity: No     Other Topics Concern     Blood Transfusions No     Sleep Concern No     Stress Concern No     Weight Concern No     Exercise Yes     Bike Helmet Yes     Seat Belt Yes     Social History Narrative    Lives at home with mother. Goes to [employer] for college, majoring in theatre.        Family History   Problem Relation Age of Onset     Unknown/Adopted Mother      Unknown/Adopted Father      Unknown/Adopted Maternal Grandmother      Unknown/Adopted Maternal Grandfather        Review of Systems   Constitutional: Negative.    HENT: Negative.    Eyes: Negative.    Respiratory: Negative.    Cardiovascular: Negative.    Gastrointestinal: Negative.    Genitourinary: Negative.    Musculoskeletal: Negative.    Skin:        Cafe au lait macules   Neurological: Negative.    Endo/Heme/Allergies: Negative.    Psychiatric/Behavioral: Negative.      See HPI. Complete ROS otherwise negative.    /84 (BP Location: Left arm, Patient Position: Chair, Cuff Size: Adult Regular)  Pulse 96  Resp 16  Ht 1.703 m (5' 7.05\")  Wt 67.3 kg (148 lb 5.9 oz)  BMI 23.21 kg/m2  Physical Exam   Constitutional: He is oriented to person, place, and time and well-developed, well-nourished, and in no distress.   HENT:   Mouth/Throat: Oropharynx is clear and moist.   Eyes: Conjunctivae and EOM are normal.   Neck: Normal range of motion.   Cardiovascular: Normal rate, regular rhythm and normal heart sounds.    Pulmonary/Chest: Effort normal and breath sounds normal.   Abdominal: Soft.   Musculoskeletal: Normal range of motion. He exhibits no edema.   Neurological: He is alert and " oriented to person, place, and time.   Negative Romberg.    Skin: Skin is warm and dry.   Psychiatric: Mood and affect normal.       Impression:  1. NF1  2. Left cataract - not NF1 related  3. Shoulder and neck pain      Plan:    1. MRI cervical spine/ brachial plexus  2. RTC for review of imaging.      Baldomero Hernandez

## 2017-06-26 ASSESSMENT — ENCOUNTER SYMPTOMS
EYES NEGATIVE: 1
GASTROINTESTINAL NEGATIVE: 1
NEUROLOGICAL NEGATIVE: 1
RESPIRATORY NEGATIVE: 1
CONSTITUTIONAL NEGATIVE: 1
ROS SKIN COMMENTS: CAFE AU LAIT MACULES
CARDIOVASCULAR NEGATIVE: 1
MUSCULOSKELETAL NEGATIVE: 1
PSYCHIATRIC NEGATIVE: 1

## 2017-06-28 ENCOUNTER — OFFICE VISIT (OUTPATIENT)
Dept: PEDIATRIC HEMATOLOGY/ONCOLOGY | Facility: CLINIC | Age: 27
End: 2017-06-28
Attending: PEDIATRICS
Payer: COMMERCIAL

## 2017-06-28 ENCOUNTER — HOSPITAL ENCOUNTER (OUTPATIENT)
Dept: MRI IMAGING | Facility: CLINIC | Age: 27
Discharge: HOME | End: 2017-06-28
Attending: PEDIATRICS | Admitting: PEDIATRICS
Payer: COMMERCIAL

## 2017-06-28 ENCOUNTER — HOSPITAL ENCOUNTER (OUTPATIENT)
Dept: MRI IMAGING | Facility: CLINIC | Age: 27
End: 2017-06-28
Attending: PEDIATRICS
Payer: COMMERCIAL

## 2017-06-28 VITALS
HEART RATE: 64 BPM | RESPIRATION RATE: 20 BRPM | TEMPERATURE: 98.5 F | BODY MASS INDEX: 23.63 KG/M2 | SYSTOLIC BLOOD PRESSURE: 122 MMHG | OXYGEN SATURATION: 100 % | DIASTOLIC BLOOD PRESSURE: 75 MMHG | HEIGHT: 67 IN | WEIGHT: 150.57 LBS

## 2017-06-28 DIAGNOSIS — Q85.01 NEUROFIBROMATOSIS, TYPE 1 (VON RECKLINGHAUSEN'S DISEASE) (H): ICD-10-CM

## 2017-06-28 DIAGNOSIS — Q85.01 NEUROFIBROMATOSIS, PERIPHERAL, NF1 (H): Primary | ICD-10-CM

## 2017-06-28 DIAGNOSIS — M21.072 ACQUIRED BILATERAL VALGUS DEFORMITY OF ANKLES: ICD-10-CM

## 2017-06-28 DIAGNOSIS — H26.9: ICD-10-CM

## 2017-06-28 DIAGNOSIS — M54.2 CERVICALGIA: ICD-10-CM

## 2017-06-28 DIAGNOSIS — M21.071 ACQUIRED BILATERAL VALGUS DEFORMITY OF ANKLES: ICD-10-CM

## 2017-06-28 DIAGNOSIS — M20.60 DEFORMITY OF TOE, UNSPECIFIED LATERALITY: ICD-10-CM

## 2017-06-28 PROCEDURE — 25000128 H RX IP 250 OP 636: Performed by: PEDIATRICS

## 2017-06-28 PROCEDURE — 70543 MRI ORBT/FAC/NCK W/O &W/DYE: CPT

## 2017-06-28 PROCEDURE — 99213 OFFICE O/P EST LOW 20 MIN: CPT | Mod: ZF

## 2017-06-28 PROCEDURE — 72156 MRI NECK SPINE W/O & W/DYE: CPT

## 2017-06-28 PROCEDURE — A9585 GADOBUTROL INJECTION: HCPCS | Performed by: PEDIATRICS

## 2017-06-28 RX ORDER — GADOBUTROL 604.72 MG/ML
7.5 INJECTION INTRAVENOUS ONCE
Status: COMPLETED | OUTPATIENT
Start: 2017-06-28 | End: 2017-06-28

## 2017-06-28 RX ADMIN — GADOBUTROL 6.5 ML: 604.72 INJECTION INTRAVENOUS at 07:20

## 2017-06-28 ASSESSMENT — ENCOUNTER SYMPTOMS
CONSTITUTIONAL NEGATIVE: 1
NEUROLOGICAL NEGATIVE: 1
EYES NEGATIVE: 1
NECK PAIN: 1
RESPIRATORY NEGATIVE: 1
GASTROINTESTINAL NEGATIVE: 1
BACK PAIN: 1
CARDIOVASCULAR NEGATIVE: 1

## 2017-06-28 ASSESSMENT — PAIN SCALES - GENERAL: PAINLEVEL: MODERATE PAIN (5)

## 2017-06-28 NOTE — MR AVS SNAPSHOT
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, 9th floor  [address] [place] [address]  Phone: [phone]  Clinic Hours:   Monday-Friday:   7 am to 5:00 pm   closed weekends and major  holidays     If your fever is 100.5  or greater,   call the clinic during business hours.   After hours call [phone] and ask for the pediatric hematology / oncology physician to be paged for you.              Care Instructions    Neurofibromatosis (NF) Clinic  [facility], 9th Floor - [address] [address]  Scheduling/Appointments: [phone]  Fax: [phone]    Numbers to call:   Monday - Friday, 8:00 am - 5:00 pm:    Non-urgent or same-day call-back concerns: [facility] Nurse Triage - Voicemail: [phone]    Urgent concerns:  Care Coordinator - Darya House RN - Pager: [phone]    Scheduling/Appointments: [phone]  Nights and weekends:   Call [phone] and ask the  to page the 'Pediatric Heme/Onc fellow on call' if you have an urgent concern that can't wait until the clinic opens.  Genetic Counselors:  Alycia Valencia: [phone]   Blanca Combs: [phone]    Darya House RN, MS  NF Care Coordinator  Pager: [phone]  E-mail: [email or web address]    --------------------------------------------------------------------------------------------------------------------------------    It was a pleasure to see  you in our Neurofibromatosis clinic today.    Here's our recommendations for follow-up care:    Referrals:  Orthotics - call the Orthotics scheduling line if you don't hear from them within 1 week.  Physical Therapy - see referral: call to schedule    Other Instructions:  Put a neck roll in your pillow.    Return to Clinic:  6 months            Follow-ups after your visit        Additional Services     PHYSICAL THERAPY REFERRAL       *This order will print in the [facility] Central Scheduling Office*     Rehabilitation Services at [facility]  [address] [place] [address]  First appointment [phone]; Rehab Clinic [phone]   [facility] provides Physical Therapy evaluation and treatment and many specialty services across the [facility] system.  If requesting a specialty program, please choose from the list below.    Call one number to schedule at any [facility] location   [phone].    Treatment: Evaluation & Treatment of Cervicalgia due to C2-3 posterior disc bulge and reversal of cervical lordosis.  Special Instructions/Modalities: specific attention to posture retraining and guidance on changing habits.  Special Programs: None    Please be aware that coverage of these services is subject to the terms and limitations of your health insurance plan.  Call member services at your health plan with any benefit or coverage questions.      **Note to Provider** To refer patients to therapy outside of the location list, change the order class to External Referral in the order composer.            ORTHOTICS REFERRAL       **This referral order prints off in the [facility]  (Orthotics & Prosthetics) Central Scheduling Office**    The [facility] Scheduling Staff will contact the patient to schedule appointments.     Central Scheduling Contact Information: [phone] ([facility])    Orthotics: Foot Orthotics  For ankle valgus. L>R. Full foot orthotic please so patient can wear when running.    Please be aware that coverage of these services is subject to the terms and limitations of your health insurance plan.  Call member services at your health plan with any benefit or coverage questions.      Please bring the following to your appointment:    >>   Any x-rays, CTs or MRIs which have been performed.  Contact the facility where they were done to arrange for  prior to your scheduled appointment.    >>   List of current medications   >>   This referral request   >>   Any documents/labs given to you for this referral                  Follow-up notes from your care team     Return in about 6 months (around 12/28/2017) for NF1 Follow-Up.      Your next 10 appointments already scheduled     Dec 19, 2017 10:00 AM CST   Return Visit with Baldomero Hernandez MD   Peds Hematology Oncology ([facility])    [facility]  9th Floor  [address] [facility] [address] [phone]              Future tests that were ordered for you today     Open Future Orders        Priority Expected Expires Ordered    ORTHOTICS REFERRAL Routine 6/28/2018 6/28/2018 6/28/2017    PHYSICAL THERAPY REFERRAL Routine 6/29/2017 6/28/2018 6/28/2017    MR Brachial Plexus w/o & w Contrast Routine  6/28/2018 6/28/2017            Who to contact     Please call your clinic at [phone] to:    Ask questions about your health    Make or cancel appointments    Discuss your medicines    Learn about your test results    Speak to your doctor   If you have compliments or concerns about an experience at your clinic, or if you wish to file a complaint, please contact [facility] Patient Relations at [phone] or email us at [email or web address]         Additional Information About Your Visit        [email or web address] Information     [employer] gives you secure  "access to your electronic health record. If you see a primary care provider, you can also send messages to your care team and make appointments. If you have questions, please call your primary care clinic.  If you do not have a primary care provider, please call [phone] and they will assist you.      [employer] is an electronic gateway that provides easy, online access to your medical records. With [employer], you can request a clinic appointment, read your test results, renew a prescription or communicate with your care team.     To access your existing account, please contact your [facility] or call [phone] for assistance.        Care EveryWhere ID     This is your Care EveryWhere ID. This could be used by other organizations to access your [place] medical records  [id number]        Your Vitals Were     Pulse Temperature Respirations Height Pulse Oximetry BMI (Body Mass Index)    64 98.5  F (36.9  C) (Oral) 20 1.708 m (5' 7.24\") 100% 23.41 kg/m2       Blood Pressure from Last 3 Encounters:   06/28/17 122/75   06/06/17 130/84   06/01/16 124/74    Weight from Last 3 Encounters:   06/28/17 68.3 kg (150 lb 9.2 oz)   06/06/17 67.3 kg (148 lb 5.9 oz)   06/01/16 66.2 kg (145 lb 15.1 oz)               Primary Care Provider Office Phone # Fax #    Sonny Lr [phone]       [place] [address]        Equal Access to Services     NENITA PILLAI : Hadii luz maria ku hadasho Soomaali, waaxda luqadaha, qaybta kaalmada adeegyada, paula weeks. So [facility] [phone].    ATENCIÓN: Si habla español, tiene a basilio disposición servicios gratuitos de asistencia lingüística. Llame al [phone].    We comply with applicable federal civil rights laws and [place] laws. We do not discriminate on the basis of race, color, national origin, age, disability sex, sexual orientation or gender identity.            Thank " you!     Thank you for choosing [facility] HEMATOLOGY ONCOLOGY  for your care. Our goal is always to provide you with excellent care. Hearing back from our patients is one way we can continue to improve our services. Please take a few minutes to complete the written survey that you may receive in the mail after your visit with us. Thank you!             Your Updated Medication List - Protect others around you: Learn how to safely use, store and throw away your medicines at www.disposemymeds.org.          This list is accurate as of: 6/28/17 10:37 AM.  Always use your most recent med list.                   Brand Name Dispense Instructions for use Diagnosis    * albuterol 108 (90 BASE) MCG/ACT Inhaler    PROAIR HFA/PROVENTIL HFA/VENTOLIN HFA    1 Inhaler    Inhale 2 puffs into the lungs every 4 hours as needed    Intermittent asthma, uncomplicated       * albuterol (2.5 MG/3ML) 0.083% neb solution     75 mL    Take 1 vial (2.5 mg) by nebulization every 6 hours as needed for shortness of breath / dyspnea or wheezing    Intermittent asthma       * methylphenidate 5 MG tablet    RITALIN    60 tablet    Take 1 tablet (5 mg) by mouth 2 times daily in the AM and at lunchtime.    Neurofibromatosis, peripheral, NF1 (H)       * methylphenidate 5 MG tablet    RITALIN    60 tablet    Take 1 tablet (5 mg) by mouth 2 times daily in the AM and at lunchtime.    Neurofibromatosis, peripheral, NF1 (H)       * methylphenidate 5 MG tablet    RITALIN    60 tablet    Take 2 tablets (10 mg) by mouth 2 times daily In the AM and at lunchtime.    Neurofibromatosis, peripheral, NF1 (H)       VITAMIN D PO      Unit amount unknown, takes one tablet PO Q day when remembers        * Notice:  This list has 5 medication(s) that are the same as other medications prescribed for you. Read the directions carefully, and ask your doctor or other care provider to review them with you.

## 2017-06-28 NOTE — LETTER
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 Since his last visit, Guy reports he has been doing well. He notes that he has been experiencing posterior neck pain, which is provoked by bending his head downward. He states that he may have tweaked it, and his neck intermittently \"pops\" when turning his head. He has been taking ibuprofen for his neck pain with minimal relief. In addition he continues to experience intermittent low back pain associated to spine sway as well as bilateral foot pain. Guy has not used orthotics since he was a baby. He denies noticing any other associated symptoms or complaints.      Fam/Soc: Guy attends [employer]. He enjoys running.     History was obtained from Guy and his mother.        Allergies   Allergen Reactions     Bacitracin Blisters     Chloral Hydrate Other (See Comments)     Patient becomes aggressive and hallucinations       Current Outpatient Prescriptions   Medication     methylphenidate (RITALIN) 5 MG tablet     albuterol (2.5 MG/3ML) 0.083% nebulizer solution     albuterol (PROAIR HFA, PROVENTIL HFA, VENTOLIN HFA) 108 (90 BASE) MCG/ACT inhaler     methylphenidate (RITALIN) 5 MG tablet     methylphenidate (RITALIN) 5 MG tablet     Cholecalciferol (VITAMIN D PO)     No current facility-administered medications for this visit.        Past Medical History:   Diagnosis Date     Asthma      Hearing loss of left ear      Neurofibromatosis (H)        Past Surgical History:   Procedure Laterality Date     BACK SURGERY       ear surgeries      3 tympanomastoidectomies       Family History   Problem Relation Age of Onset     Unknown/Adopted Mother      Unknown/Adopted Father      Unknown/Adopted Maternal Grandmother      " "Unknown/Adopted Maternal Grandfather        Review of Systems   Constitutional: Negative.    HENT: Negative.    Eyes: Negative.         Left cataract    Respiratory: Negative.    Cardiovascular: Negative.    Gastrointestinal: Negative.    Genitourinary: Negative.    Musculoskeletal: Positive for back pain and neck pain.        Bilateral foot pain    Skin: Negative.    Neurological: Negative.    All other systems reviewed and are negative.      /75 (BP Location: Left arm, Patient Position: Fowlers, Cuff Size: Adult Regular)  Pulse 64  Temp 98.5  F (36.9  C) (Oral)  Resp 20  Ht 1.708 m (5' 7.24\")  Wt 68.3 kg (150 lb 9.2 oz)  SpO2 100%  BMI 23.41 kg/m2    Physical Exam   Constitutional: He is oriented to person, place, and time and well-developed, well-nourished, and in no distress.   HENT:   Head: Normocephalic and atraumatic.   Right Ear: External ear normal.   Left Ear: External ear normal.   Mouth/Throat: Oropharynx is clear and moist.   Eyes: Conjunctivae and EOM are normal. Pupils are equal, round, and reactive to light.   Neck: Normal range of motion. Neck supple.   Cardiovascular: Normal rate, regular rhythm and normal heart sounds.    Pulmonary/Chest: Effort normal and breath sounds normal.   Abdominal: Soft. Bowel sounds are normal.   Musculoskeletal: Normal range of motion.   Bilateral ankle valgus, left is worse than right   Neurological: He is alert and oriented to person, place, and time. GCS score is 15.   Skin: Skin is warm and dry.             Results for orders placed or performed in visit on 06/01/16   XR Spine Complete 2 Views    Narrative    Exam: Complete spine radiograph on 6/1/2016.    History: Rule out scoliosis, NF1.    Comparison: 9/21/2012.    Findings: AP and lateral views of the entire spine were obtained.    There is subtle S. Shaped scoliosis of the thoracic and lumbar spine,  levocurvature in the thoracic spine and dextrocurvature in the lumbar  spine measuring less than 10 " degrees. There is mild pelvic tilt with  the right iliac crest slightly higher than the left. There is minimal  rightward coronal imbalance. There is is mild negative sagittal  imbalance. No vertebral body anomaly is identified. Straightening of  the cervical lordosis, which may be positional. Normal thoracic  kyphosis and mild exaggerated lumbar lordosis.     Cardiac silhouette and pulmonary vasculature are within normal limits.  No pneumothorax, pleural effusion or focal airspace opacities.  Nonobstructive bowel gas pattern.      Impression    Impression:  Mild S. shaped scoliotic curvature of the thoracolumbar  spine, similar to prior. Negative sagittal imbalance, slightly  progressed since prior study. Minimal pelvic tilt, not significant  changed.    I have personally reviewed the examination and initial interpretation  and I agree with the findings.    BIANCA CHRISTIANSON MD     MR CERVICAL SPINE W/O & W CONTRAST, MR BRACHIAL PLEXUS W/O  & W CONTRAST 6/28/2017 7:39 AM     Provided History: evaluate c-spine for new &/or progressive  neurofibromas, Neurofibromatosis, type 1     Comparison: No similar prior studies     Technique:   Cervical: Sagittal T1-weighted, sagittal T2-weighted, sagittal  diffusion weighted, axial T2-weighted, and axial T2* gradient echo  images of the cervical spine were obtained without intravenous  contrast. Following intravenous administration of gadolinium, axial  and sagittal T1-weighted images with fat saturation were also  obtained.  Brachial plexus:Axial fat saturated T2-weighted, sagittal T1-weighted,  oblique coronal T1- and fat saturated T2-weighted images of the  brachial plexus bilaterally obtained without intravenous contrast.  After intravenous gadolinium administration, fat saturated sagital,  coronal and axial T1-weighted images were obtained.     Contrast: Outside cervical MR 2/23/2007     Findings:  Cervical: The cervical vertebrae appear normally aligned.   Straightening of  normal cervical lordosis. There is no disc height  narrowing at any level.  There is normal signal within and normal  contour of the cervical spinal cord.  There is no abnormal contrast  enhancement within the cervical spinal cord, thecal sac or vertebral  column.       Tiny left central inferiorly migrating extrusion with effacement of  the ventral subarachnoid space. No spinal canal or neural foraminal  stenosis.     No abnormality of the paraspinous soft tissues.     Brachial plexus: There are several T2 hyperintense, enhancing lesions  in bilateral anterior chest and level 4 reaching up to 1.5 cm in the  right chest. The largest lesion in the right appears to abut the  brachial plexus. These could represent lymph node versus  neurofibromas.     The roots appear normal along their course. No definite mass or lesion  is noted in the adjacent lung apices.     Post-intravenous contrast images also demonstrate no abnormality of  the brachial plexus.         Impression:   1. Cervical spine: No abnormal enhancement in the spinal cord, thecal  sac or cervical vertebrae. No definite neurofibroma. No spinal canal  or neural foraminal stenosis.  2. Brachial plexus: No definite mass in the brachial plexus on either  side. Enhancing lesions in bilateral anterior chest and bilateral  level 4, could represent axillary lymph nodes versus neurofibromas.  The largest lesion on the right appears to abut the brachial plexus.     I have personally reviewed the examination and initial interpretation  and I agree with the findings.     ARMIDA SCHULTZ MD      Impression:  1. NF1  2. Bilateral ankle valgus  3. Left cataract - not NF1 related  4. Neck pain unrelated to NF1  5. MR brachial plexus and cervical spine- no significant pathology   6. Neck pain related to mild disc protrusion     Plan:  1. Recommend orthotics for bilateral ankle valgus   2. Follow up with Physical Therapy for neck pain   3. RTC in 6 months to assess the  effectiveness of today's interventions, or sooner PRN       Time spent with patient 25 minutes. Over 50% of the visit was spent counseling the patient on his MR results and treatment plan      This document serves as a record of the services and decisions personally performed and made by Baldomero Hernandez MD. It was created on his behalf by Jasmin Pruitt, a trained medical scribe. The creation of this document is based on the provider's statements to the medical scribe.    The documentation recorded by the scribe accurately reflects the services I personally performed and the decisions made by me.      Baldomero Hernandez    CC  Patient Care Team:  Sonny Lr as PCP - General (Internal Medicine)  Susan House, RN as Continuity Care Coordinator (Pediatric Hematology/Oncology)  Baldomero Hernandez MD as Referring Physician (Pediatric Hematology/Oncology)  Daphne Egan, APRN CNP as Nurse Practitioner (Pediatric Hematology/Oncology)  Radha Hammond, PhD LP (Psychology)  DUSTIN MCKNIGHT    Copy to patient  DEBBIE CLARKKENNA  [id number]  [facility] [address]      Baldomero Hernandez MD

## 2017-06-28 NOTE — NURSING NOTE
"Chief Complaint   Patient presents with     RECHECK     Patient is here today for Neurofibromatosis, peripheral, NF 1 follow up     /75 (BP Location: Left arm, Patient Position: Fowlers, Cuff Size: Adult Regular)  Pulse 64  Temp 98.5  F (36.9  C) (Oral)  Resp 20  Ht 1.708 m (5' 7.24\")  Wt 68.3 kg (150 lb 9.2 oz)  SpO2 100%  BMI 23.41 kg/m2  I spent 10 minutes reviewing medications, allergies, and obtaining vitals.    Adrianne Mcdonald LPN  June 28, 2017    "

## 2017-06-28 NOTE — LETTER
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 Since his last visit, Guy reports he has been doing well. He notes that he has been experiencing posterior neck pain, which is provoked by bending his head downward. He states that he may have tweaked it, and his neck intermittently \"pops\" when turning his head. He has been taking ibuprofen for his neck pain with minimal relief. In addition he continues to experience intermittent low back pain associated to spine sway as well as bilateral foot pain. Guy has not used orthotics since he was a baby. He denies noticing any other associated symptoms or complaints.      Fam/Soc: Guy attends [employer]. He enjoys running.     History was obtained from Guy and his mother.        Allergies   Allergen Reactions     Bacitracin Blisters     Chloral Hydrate Other (See Comments)     Patient becomes aggressive and hallucinations       Current Outpatient Prescriptions   Medication     methylphenidate (RITALIN) 5 MG tablet     albuterol (2.5 MG/3ML) 0.083% nebulizer solution     albuterol (PROAIR HFA, PROVENTIL HFA, VENTOLIN HFA) 108 (90 BASE) MCG/ACT inhaler     methylphenidate (RITALIN) 5 MG tablet     methylphenidate (RITALIN) 5 MG tablet     Cholecalciferol (VITAMIN D PO)     No current facility-administered medications for this visit.        Past Medical History:   Diagnosis Date     Asthma      Hearing loss of left ear      Neurofibromatosis (H)        Past Surgical History:   Procedure Laterality Date     BACK SURGERY       ear surgeries      3 tympanomastoidectomies       Family History   Problem Relation Age of Onset     Unknown/Adopted Mother      Unknown/Adopted Father      Unknown/Adopted Maternal Grandmother      " "Unknown/Adopted Maternal Grandfather        Review of Systems   Constitutional: Negative.    HENT: Negative.    Eyes: Negative.         Left cataract    Respiratory: Negative.    Cardiovascular: Negative.    Gastrointestinal: Negative.    Genitourinary: Negative.    Musculoskeletal: Positive for back pain and neck pain.        Bilateral foot pain    Skin: Negative.    Neurological: Negative.    All other systems reviewed and are negative.      /75 (BP Location: Left arm, Patient Position: Fowlers, Cuff Size: Adult Regular)  Pulse 64  Temp 98.5  F (36.9  C) (Oral)  Resp 20  Ht 1.708 m (5' 7.24\")  Wt 68.3 kg (150 lb 9.2 oz)  SpO2 100%  BMI 23.41 kg/m2    Physical Exam   Constitutional: He is oriented to person, place, and time and well-developed, well-nourished, and in no distress.   HENT:   Head: Normocephalic and atraumatic.   Right Ear: External ear normal.   Left Ear: External ear normal.   Mouth/Throat: Oropharynx is clear and moist.   Eyes: Conjunctivae and EOM are normal. Pupils are equal, round, and reactive to light.   Neck: Normal range of motion. Neck supple.   Cardiovascular: Normal rate, regular rhythm and normal heart sounds.    Pulmonary/Chest: Effort normal and breath sounds normal.   Abdominal: Soft. Bowel sounds are normal.   Musculoskeletal: Normal range of motion.   Bilateral ankle valgus, left is worse than right   Neurological: He is alert and oriented to person, place, and time. GCS score is 15.   Skin: Skin is warm and dry.             Results for orders placed or performed in visit on 06/01/16   XR Spine Complete 2 Views    Narrative    Exam: Complete spine radiograph on 6/1/2016.    History: Rule out scoliosis, NF1.    Comparison: 9/21/2012.    Findings: AP and lateral views of the entire spine were obtained.    There is subtle S. Shaped scoliosis of the thoracic and lumbar spine,  levocurvature in the thoracic spine and dextrocurvature in the lumbar  spine measuring less than 10 " degrees. There is mild pelvic tilt with  the right iliac crest slightly higher than the left. There is minimal  rightward coronal imbalance. There is is mild negative sagittal  imbalance. No vertebral body anomaly is identified. Straightening of  the cervical lordosis, which may be positional. Normal thoracic  kyphosis and mild exaggerated lumbar lordosis.     Cardiac silhouette and pulmonary vasculature are within normal limits.  No pneumothorax, pleural effusion or focal airspace opacities.  Nonobstructive bowel gas pattern.      Impression    Impression:  Mild S. shaped scoliotic curvature of the thoracolumbar  spine, similar to prior. Negative sagittal imbalance, slightly  progressed since prior study. Minimal pelvic tilt, not significant  changed.    I have personally reviewed the examination and initial interpretation  and I agree with the findings.    BIANCA CHRISTIANSON MD     MR CERVICAL SPINE W/O & W CONTRAST, MR BRACHIAL PLEXUS W/O  & W CONTRAST 6/28/2017 7:39 AM     Provided History: evaluate c-spine for new &/or progressive  neurofibromas, Neurofibromatosis, type 1     Comparison: No similar prior studies     Technique:   Cervical: Sagittal T1-weighted, sagittal T2-weighted, sagittal  diffusion weighted, axial T2-weighted, and axial T2* gradient echo  images of the cervical spine were obtained without intravenous  contrast. Following intravenous administration of gadolinium, axial  and sagittal T1-weighted images with fat saturation were also  obtained.  Brachial plexus:Axial fat saturated T2-weighted, sagittal T1-weighted,  oblique coronal T1- and fat saturated T2-weighted images of the  brachial plexus bilaterally obtained without intravenous contrast.  After intravenous gadolinium administration, fat saturated sagital,  coronal and axial T1-weighted images were obtained.     Contrast: Outside cervical MR 2/23/2007     Findings:  Cervical: The cervical vertebrae appear normally aligned.   Straightening of  normal cervical lordosis. There is no disc height  narrowing at any level.  There is normal signal within and normal  contour of the cervical spinal cord.  There is no abnormal contrast  enhancement within the cervical spinal cord, thecal sac or vertebral  column.       Tiny left central inferiorly migrating extrusion with effacement of  the ventral subarachnoid space. No spinal canal or neural foraminal  stenosis.     No abnormality of the paraspinous soft tissues.     Brachial plexus: There are several T2 hyperintense, enhancing lesions  in bilateral anterior chest and level 4 reaching up to 1.5 cm in the  right chest. The largest lesion in the right appears to abut the  brachial plexus. These could represent lymph node versus  neurofibromas.     The roots appear normal along their course. No definite mass or lesion  is noted in the adjacent lung apices.     Post-intravenous contrast images also demonstrate no abnormality of  the brachial plexus.         Impression:   1. Cervical spine: No abnormal enhancement in the spinal cord, thecal  sac or cervical vertebrae. No definite neurofibroma. No spinal canal  or neural foraminal stenosis.  2. Brachial plexus: No definite mass in the brachial plexus on either  side. Enhancing lesions in bilateral anterior chest and bilateral  level 4, could represent axillary lymph nodes versus neurofibromas.  The largest lesion on the right appears to abut the brachial plexus.     I have personally reviewed the examination and initial interpretation  and I agree with the findings.     ARMIDA SCHULTZ MD      Impression:  1. NF1  2. Bilateral ankle valgus  3. Left cataract - not NF1 related  4. Neck pain unrelated to NF1  5. MR brachial plexus and cervical spine- no significant pathology   6. Neck pain related to mild disc protrusion     Plan:  1. Recommend orthotics for bilateral ankle valgus   2. Follow up with Physical Therapy for neck pain   3. RTC in 6 months to assess the  effectiveness of today's interventions, or sooner PRN       Time spent with patient 25 minutes. Over 50% of the visit was spent counseling the patient on his MR results and treatment plan      This document serves as a record of the services and decisions personally performed and made by Baldomero Hernandez MD. It was created on his behalf by Jasmin Pruitt, a trained medical scribe. The creation of this document is based on the provider's statements to the medical scribe.    The documentation recorded by the scribe accurately reflects the services I personally performed and the decisions made by me.      Baldomero Hernandez    CC  Patient Care Team:  Sonny Lr as PCP - General (Internal Medicine)  Susan House, RN as Continuity Care Coordinator (Pediatric Hematology/Oncology)  Daphne Egan, CAIO CNP as Nurse Practitioner (Pediatric Hematology/Oncology)  Radha Hammond, PhD LP (Psychology)  DUSTIN MCKNIGHT    Copy to patient  DEBBIE CLARKKENNA  [address]  [id number]  [facility] [address]

## 2017-07-10 ENCOUNTER — THERAPY VISIT (OUTPATIENT)
Dept: PHYSICAL THERAPY | Facility: CLINIC | Age: 27
End: 2017-07-10
Payer: COMMERCIAL

## 2017-07-10 DIAGNOSIS — M54.2 CERVICALGIA: Primary | ICD-10-CM

## 2017-07-10 PROCEDURE — 97161 PT EVAL LOW COMPLEX 20 MIN: CPT | Mod: GP | Performed by: PHYSICAL THERAPIST

## 2017-07-10 PROCEDURE — 97112 NEUROMUSCULAR REEDUCATION: CPT | Mod: GP | Performed by: PHYSICAL THERAPIST

## 2017-07-10 PROCEDURE — 97110 THERAPEUTIC EXERCISES: CPT | Mod: GP | Performed by: PHYSICAL THERAPIST

## 2017-07-10 NOTE — PROGRESS NOTES
[facility] Initial Evaluation        Subjective:    Patient is a 26 year old male presenting with rehab cervical spine hpi.   Guy Thornton is a 26 year old male with a cervical spine condition.  Condition occurred with:  Insidious onset (Cervical pain , when looking down and when first waking up in the morning. Also has ankle pain at the ball of his great toe on both sides).  Condition occurred: for unknown reasons.  This is a chronic condition  3 months now per patient. 6/28/17 Date of MD order.    Patient reports pain:  Cervical left side and cervical right side.  Radiates to:  Shoulder right and shoulder left (low back is also bothersome as well, pain at ball of great toe).  Pain is described as aching and is intermittent and reported as 8/10.  Associated symptoms:  Headache. Pain is worse in the A.M..  Exacerbated by: looking Down, prolonged computer use,  Relieved by: self massage, heat / ice pack.  Since onset symptoms are unchanged.  Special testing: loss of cervical lordosis, posterior disc bulge.       General health as reported by patient is excellent.  Pertinent medical history includes:  Asthma (neurofibromatosis).  Medical allergies: no.  Other surgeries include:  Other (3 tympano mastoidectomnies, one w/ a partial meatus surger on back from laceration down to spine. ).  Medication history: methylphenidate, albuterol.  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  Patient is working in normal job without restrictions.  Primary job tasks include:  Prolonged sitting (computer work).    Barriers include:  None as reported by the patient.    Red flags:  None as reported by the patient.                        Objective:    Standing Alignment:    Cervical/Thoracic:  Cervical lordosis decreased and thoracic kyphosis increased  Shoulder/UE:  Rounded shoulders                                  Cervical/Thoracic  Evaluation      Cervical Myotomes:  normal                  DTR's:  not assessed (no radicular symptoms)          Cervical Dermatomes:  not assessed                    Cervical Palpation:    Tenderness present at Left:    Upper Trap; Levator and Erector Spinae  Tenderness not present at Left:   Sternocleidomstoid; Scalenes; Rhomboids or Suboccipitals      Cervical Stability/Joint Clearing:      Left negative at: TLA LAT or VAT    Right negative at:  TLA LAT or VAT  Negative:ALAR Ligament and TLA AP                                              [facility] Cervical Evaluation    Posture:  Sitting: fair  Standing: fair  Protruding Head: no  Wry Neck: no    Other Observations: 3-4/10 PL in bilateral upper.   Movement Loss:  Protrusion (PRO): min  Flexion (Flex): min and pain  Retraction (RET): nil  Extension (EXT): nil  Lateral Flexion Right (LF R): mod and pain  Lateral Flexion Left (LF L): pain and min  Rotation Right (ROT R): min and pain  Rotation Left (ROT L): min and pain  Test Movements:      RET: During: no effect  After: no effect    Repeat RET: During: no effect  After: no effect    RET EXT: During: increases  After: no worse    Repeat RET EXT: During: increases  After: better                          Conclusion: derangement  Principle of Treatment:    Flexion: avoid  Extension: cervical derangement repeated ext. x 10 reps 6-8 x per day                                              ROS    Assessment/Plan:      Patient is a 26 year old male with cervical complaints.    Patient has the following significant findings with corresponding treatment plan.                Diagnosis 1:  Cervical pain   Pain -  hot/cold therapy, US, manual therapy, self management, education, directional preference exercise and home program  Decreased ROM/flexibility - manual therapy, therapeutic exercise, therapeutic activity and home program  Decreased joint mobility - manual therapy, therapeutic exercise, therapeutic activity and home  program  Decreased function - therapeutic activities and home program  Impaired posture - neuro re-education, therapeutic activities and home program    Therapy Evaluation Codes:   1) History comprised of:   Personal factors that impact the plan of care:      None.    Comorbidity factors that impact the plan of care are:      neurofibromatosis.     Medications impacting care: None.  2) Examination of Body Systems comprised of:   Body structures and functions that impact the plan of care:      Ankle and Cervical spine.   Activity limitations that impact the plan of care are:      Reading/Computer work.  3) Clinical presentation characteristics are:   Stable/Uncomplicated.  4) Decision-Making    Low complexity using standardized patient assessment instrument and/or measureable assessment of functional outcome.  Cumulative Therapy Evaluation is: Low complexity.    Previous and current functional limitations:  (See Goal Flow Sheet for this information)    Short term and Long term goals: (See Goal Flow Sheet for this information)     Communication ability:  Patient appears to be able to clearly communicate and understand verbal and written communication and follow directions correctly.  Treatment Explanation - The following has been discussed with the patient:   RX ordered/plan of care  Anticipated outcomes  Possible risks and side effects  This patient would benefit from PT intervention to resume normal activities.   Rehab potential is good.    Frequency:  1 X week, once daily  Duration:  for 6 weeks  Discharge Plan:  Achieve all LTG.  Independent in home treatment program.  Reach maximal therapeutic benefit.    Please refer to the daily flowsheet for treatment today, total treatment time and time spent performing 1:1 timed codes.

## 2017-07-17 ENCOUNTER — THERAPY VISIT (OUTPATIENT)
Dept: PHYSICAL THERAPY | Facility: CLINIC | Age: 27
End: 2017-07-17
Payer: COMMERCIAL

## 2017-07-17 DIAGNOSIS — M54.2 CERVICALGIA: ICD-10-CM

## 2017-07-17 PROCEDURE — 97110 THERAPEUTIC EXERCISES: CPT | Mod: GP | Performed by: PHYSICAL THERAPIST

## 2017-07-24 ENCOUNTER — THERAPY VISIT (OUTPATIENT)
Dept: PHYSICAL THERAPY | Facility: CLINIC | Age: 27
End: 2017-07-24
Payer: COMMERCIAL

## 2017-07-24 DIAGNOSIS — M54.2 CERVICALGIA: ICD-10-CM

## 2017-07-24 DIAGNOSIS — M79.671 PAIN IN BOTH FEET: Primary | ICD-10-CM

## 2017-07-24 DIAGNOSIS — M79.672 PAIN IN BOTH FEET: Primary | ICD-10-CM

## 2017-07-24 PROCEDURE — 97112 NEUROMUSCULAR REEDUCATION: CPT | Mod: GP | Performed by: PHYSICAL THERAPIST

## 2017-07-24 PROCEDURE — 97110 THERAPEUTIC EXERCISES: CPT | Mod: GP | Performed by: PHYSICAL THERAPIST

## 2017-07-24 NOTE — PROGRESS NOTES
Subjective:    HPI                    Objective:    System    Physical Exam    General     ROS    Assessment/Plan:      PROGRESS  REPORT    Progress reporting period is from 7/11/17 to 7/24/17.       SUBJECTIVE  Patient reports pain has been about 1 / 10 PL. right rotation definitly seemed to help. Patient would like his ankle looked at more today. Patient reports ball of toes hurt when standing for a couple hours.     Current Pain level: 1/10 (ankle / foot pain of 6/10 PL).     Initial Pain level: 8/10.   Changes in function:  Yes (See Goal flowsheet attached for changes in current functional level)  Adverse reaction to treatment or activity: None    OBJECTIVE  Finished evaluation for Ankles. Pes planus bilaterally. claw toes bilaterally in 3rd phalnage. ER 4/5, abduction 4/5, Femoral Internal rotation bilaterally.      ASSESSMENT/PLAN  Updated problem list and treatment plan: Diagnosis 1:  cerivcal pain  Pain -  hot/cold therapy, manual therapy, self management, education, directional preference exercise and home program  Decreased ROM/flexibility - manual therapy, therapeutic exercise, therapeutic activity and home program  Decreased strength - therapeutic exercise, therapeutic activities and home program  Decreased function - therapeutic activities and home program  Impaired posture - neuro re-education, therapeutic activities and home program  Diagnosis 2:  Bilateral great toe pain / ankle postural dysfunction   Pain -  manual therapy, self management, education, directional preference exercise and home program  Decreased strength - therapeutic exercise, therapeutic activities and home program  Decreased function - therapeutic activities and home program  Impaired posture - neuro re-education, therapeutic activities and home program  STG/LTGs have been met or progress has been made towards goals:  Yes (See Goal flow sheet completed today.)  Assessment of Progress: The patient's condition is improving.  Self  Management Plans:  Patient is independent in a home treatment program.  I have re-evaluated this patient and find that the nature, scope, duration and intensity of the therapy is appropriate for the medical condition of the patient.  Guy continues to require the following intervention to meet STG and LTG's:  PT    Recommendations:  This patient would benefit from continued therapy.     Frequency:  1 X week, once daily  Duration:  for 4 weeks          Please refer to the daily flowsheet for treatment today, total treatment time and time spent performing 1:1 timed codes.

## 2017-08-01 ENCOUNTER — MYC MEDICAL ADVICE (OUTPATIENT)
Dept: PEDIATRIC HEMATOLOGY/ONCOLOGY | Facility: CLINIC | Age: 27
End: 2017-08-01

## 2017-08-07 ENCOUNTER — THERAPY VISIT (OUTPATIENT)
Dept: PHYSICAL THERAPY | Facility: CLINIC | Age: 27
End: 2017-08-07
Payer: COMMERCIAL

## 2017-08-07 DIAGNOSIS — M79.672 PAIN IN BOTH FEET: ICD-10-CM

## 2017-08-07 DIAGNOSIS — M79.671 PAIN IN BOTH FEET: ICD-10-CM

## 2017-08-07 DIAGNOSIS — M54.2 CERVICALGIA: ICD-10-CM

## 2017-08-07 PROCEDURE — 97112 NEUROMUSCULAR REEDUCATION: CPT | Mod: GP | Performed by: PHYSICAL THERAPIST

## 2017-08-07 PROCEDURE — 97110 THERAPEUTIC EXERCISES: CPT | Mod: GP | Performed by: PHYSICAL THERAPIST

## 2017-08-14 ENCOUNTER — THERAPY VISIT (OUTPATIENT)
Dept: PHYSICAL THERAPY | Facility: CLINIC | Age: 27
End: 2017-08-14
Payer: COMMERCIAL

## 2017-08-14 DIAGNOSIS — M54.2 CERVICALGIA: ICD-10-CM

## 2017-08-14 DIAGNOSIS — M79.671 PAIN IN BOTH FEET: ICD-10-CM

## 2017-08-14 DIAGNOSIS — M79.672 PAIN IN BOTH FEET: ICD-10-CM

## 2017-08-14 PROCEDURE — 97110 THERAPEUTIC EXERCISES: CPT | Mod: GP | Performed by: PHYSICAL THERAPIST

## 2017-08-14 PROCEDURE — 97140 MANUAL THERAPY 1/> REGIONS: CPT | Mod: GP | Performed by: PHYSICAL THERAPIST

## 2017-08-21 ENCOUNTER — THERAPY VISIT (OUTPATIENT)
Dept: PHYSICAL THERAPY | Facility: CLINIC | Age: 27
End: 2017-08-21
Payer: COMMERCIAL

## 2017-08-21 DIAGNOSIS — M79.671 PAIN IN BOTH FEET: ICD-10-CM

## 2017-08-21 DIAGNOSIS — M79.672 PAIN IN BOTH FEET: ICD-10-CM

## 2017-08-21 DIAGNOSIS — M54.2 CERVICALGIA: ICD-10-CM

## 2017-08-21 PROCEDURE — 97110 THERAPEUTIC EXERCISES: CPT | Mod: GP | Performed by: PHYSICAL THERAPIST

## 2017-08-21 PROCEDURE — 97112 NEUROMUSCULAR REEDUCATION: CPT | Mod: GP | Performed by: PHYSICAL THERAPIST

## 2017-08-22 NOTE — PROGRESS NOTES
Subjective:    HPI                    Objective:    System    Physical Exam    General     ROS    Assessment/Plan:      DISCHARGE REPORT    Progress reporting period is from 7/24/17 to 8/21/17.       SUBJECTIVE  Patient reports shoulders are still sore when he pinches them back. Patient reports orthotics are causing him a little bit of soreness but if he takes a little break theres no pain. Overall feels pretty good. no longer having back pain.     Current Pain level: 0/10.     Initial Pain level: 8/10.   Changes in function:  Yes (See Goal flowsheet attached for changes in current functional level)  Adverse reaction to treatment or activity: None    OBJECTIVE  Patient SORAYA is WNL and has no radicular symptoms present. Slight pain with retraction of bilateral shoulder blades throughout left UT. Genu valgus and pes planus throughout bilateral LE, hip extension 4/5, hip flexion 5/5, hip IR 4+/5 and hip abduction 4/5 bilaterally. Patient now independent with HEP focus on strengthing LE to reduce genu valgus and decrease great toe pain frequency.      ASSESSMENT/PLAN  Updated problem list and treatment plan: Diagnosis 1:  Cervical pain  Decreased strength - home program  Impaired posture - home program  Diagnosis 2:  Bilateral great toe pain/ ankle postural dysfunction   Decreased strength - home program  Impaired posture - home program  STG/LTGs have been met or progress has been made towards goals:  Yes (See Goal flow sheet completed today.)  Assessment of Progress: The patient has met all of their long term goals.  Self Management Plans:  Patient is independent in a home treatment program.  I have re-evaluated this patient and find that the nature, scope, duration and intensity of the therapy is appropriate for the medical condition of the patient.  Guy continues to require the following intervention to meet STG and LTG's:  PT intervention is no longer required to meet STG/LTG.    Recommendations:  This patient is  ready to be discharged from therapy and continue their home treatment program.    Please refer to the daily flowsheet for treatment today, total treatment time and time spent performing 1:1 timed codes.

## 2017-10-30 NOTE — PATIENT INSTRUCTIONS
Neurofibromatosis (NF) Clinic  [facility], 9th Floor - [facility]  [address] [place] [address]  Scheduling/Appointments: [phone]  Fax: [phone]    Numbers to call:   Monday - Friday, 8:00 am - 5:00 pm:    Non-urgent or same-day call-back concerns: [facility] Nurse Triage - Voicemail: [phone]    Urgent concerns: NF Care Coordinator - Darya House RN - Pager: [phone]    Scheduling/Appointments: [phone]  Nights and weekends:   Call [phone] and ask the  to page the 'Pediatric Heme/Onc fellow on call' if you have an urgent concern that can't wait until the clinic opens.  Genetic Counselors:  Alycia Valencia: [phone]   Blanca Rollemark: [phone]    Darya House RN, MS  NF Care Coordinator  Pager: [phone]  E-mail: [email or web address]    --------------------------------------------------------------------------------------------------------------------------------    It was a pleasure to see you in our Neurofibromatosis clinic today.    Here's our recommendations for follow-up care:    Referrals:  Orthotics - call the Orthotics scheduling line if you don't hear from them within 1 week.  Physical Therapy - see referral: call to schedule    Other Instructions:  Put a neck roll in your pillow.    Return to Clinic:  6 months      used

## 2018-02-28 ENCOUNTER — OFFICE VISIT (OUTPATIENT)
Dept: PEDIATRIC HEMATOLOGY/ONCOLOGY | Facility: CLINIC | Age: 28
End: 2018-02-28
Attending: PEDIATRICS
Payer: COMMERCIAL

## 2018-02-28 ENCOUNTER — TELEPHONE (OUTPATIENT)
Dept: AUDIOLOGY | Facility: CLINIC | Age: 28
End: 2018-02-28

## 2018-02-28 VITALS
OXYGEN SATURATION: 100 % | BODY MASS INDEX: 22.87 KG/M2 | DIASTOLIC BLOOD PRESSURE: 73 MMHG | RESPIRATION RATE: 20 BRPM | HEIGHT: 67 IN | TEMPERATURE: 98.8 F | WEIGHT: 145.72 LBS | SYSTOLIC BLOOD PRESSURE: 124 MMHG | HEART RATE: 70 BPM

## 2018-02-28 DIAGNOSIS — Q85.01 NEUROFIBROMATOSIS, PERIPHERAL, NF1 (H): Primary | ICD-10-CM

## 2018-02-28 PROCEDURE — G0463 HOSPITAL OUTPT CLINIC VISIT: HCPCS | Mod: ZF

## 2018-02-28 ASSESSMENT — ENCOUNTER SYMPTOMS
DIARRHEA: 0
NAUSEA: 0
NEUROLOGICAL NEGATIVE: 1
CONSTIPATION: 0
RESPIRATORY NEGATIVE: 1
NUMBNESS: 0
BACK PAIN: 0
GASTROINTESTINAL NEGATIVE: 1
PSYCHIATRIC NEGATIVE: 1
NECK PAIN: 0
VOMITING: 0
CARDIOVASCULAR NEGATIVE: 1

## 2018-02-28 ASSESSMENT — PAIN SCALES - GENERAL: PAINLEVEL: NO PAIN (0)

## 2018-02-28 NOTE — LETTER
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    Since the last visit, Guy has been doing well. Mother had a recent upper respiratory illness but Guy stayed healthy. Guy reports that his foot orthotics from [facility] have helped with his bilateral ankle valgus. He attended PT at [facility] which helped relieve neck pain. He no longer experiences a \"tingling\" sensation in the fingers of his right hand. Guy has occasional allergic reactions to his new puppy, Yvonne. He reports that he has not had any recent headaches. Currently, Guy's ear is plugged with cerumen and he reports that he will require new hearing aids. Mother reports that Guy's vision has improved to 20/30 with left cataract. Guy has a stable benign nevus on his toe.     There are no other concerns or complaints at this time.     Fam/Soc: Guy attends college at [employer] in [place]. 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 Guy is currently taking a dance class. He will take a Western Civilization course and math course this coming semester. The family recently got a puppy named Yvonne.     History was obtained from patient and patient's mother.    Allergies   Allergen Reactions     Bacitracin Blisters     Chloral Hydrate Other (See Comments)     Patient becomes aggressive and hallucinations       Current Outpatient Prescriptions   Medication     methylphenidate (RITALIN) 5 MG tablet     albuterol (2.5 MG/3ML) 0.083% nebulizer solution     albuterol (PROAIR HFA, PROVENTIL HFA, VENTOLIN HFA) 108 (90 BASE) MCG/ACT inhaler     methylphenidate (RITALIN) 5 MG tablet     methylphenidate (RITALIN) 5 MG tablet     Cholecalciferol (VITAMIN D PO)     No current facility-administered medications for " "this visit.        Past Medical History:   Diagnosis Date     Asthma      Hearing loss of left ear      Neurofibromatosis (H)        Past Surgical History:   Procedure Laterality Date     BACK SURGERY       ear surgeries      3 tympanomastoidectomies       Family History   Problem Relation Age of Onset     Unknown/Adopted Mother      Unknown/Adopted Father      Unknown/Adopted Maternal Grandmother      Unknown/Adopted Maternal Grandfather      Review of Systems   HENT: Positive for hearing loss.         Guy's ear is plugged with cerumen and he reports that he will require new hearing aids.    Eyes:        Vision has improved to 20/30 with left cataract.    Respiratory: Negative.    Cardiovascular: Negative.    Gastrointestinal: Negative.  Negative for constipation, diarrhea, nausea and vomiting.   Genitourinary: Negative.    Musculoskeletal: Negative for back pain and neck pain (Neck pain has resolved with PT. ).        Bilateral foot pain has improved with orthotics.    Skin: Negative.         Stable mole on right second toe.    Allergic/Immunologic:        Allergic to dogs.    Neurological: Negative.  Negative for numbness.   Psychiatric/Behavioral: Negative.    All other systems reviewed and are negative.      /73 (BP Location: Left arm, Patient Position: Fowlers, Cuff Size: Adult Large)  Pulse 70  Temp 98.8  F (37.1  C) (Oral)  Resp 20  Ht 1.702 m (5' 7.01\")  Wt 66.1 kg (145 lb 11.6 oz)  HC 59.4 cm  SpO2 100%  BMI 22.82 kg/m2  Physical Exam   Constitutional: He is oriented to person, place, and time and well-developed, well-nourished, and in no distress.   HENT:   Head: Normocephalic and atraumatic.   Right Ear: External ear normal.   Left Ear: External ear normal.   Minor scarring of left tympanic membrane. Dull right tympanic membrane.    Musculoskeletal:   Left sternocleidomastoid tightness    Neurological: He is alert and oriented to person, place, and time. GCS score is 15.   Skin: Skin is " warm and dry.   Stable compared to previous visit.    Psychiatric: Mood and affect normal.       Results for orders placed or performed during the hospital encounter of 06/28/17   MR Brachial Plexus w/o & w Contrast    Narrative    MR CERVICAL SPINE W/O & W CONTRAST, MR BRACHIAL PLEXUS W/O  & W CONTRAST 6/28/2017 7:39 AM    Provided History: evaluate c-spine for new &/or progressive  neurofibromas, Neurofibromatosis, type 1    Comparison: No similar prior studies    Technique:   Cervical: Sagittal T1-weighted, sagittal T2-weighted, sagittal  diffusion weighted, axial T2-weighted, and axial T2* gradient echo  images of the cervical spine were obtained without intravenous  contrast. Following intravenous administration of gadolinium, axial  and sagittal T1-weighted images with fat saturation were also  obtained.  Brachial plexus:Axial fat saturated T2-weighted, sagittal T1-weighted,  oblique coronal T1- and fat saturated T2-weighted images of the  brachial plexus bilaterally obtained without intravenous contrast.  After intravenous gadolinium administration, fat saturated sagital,  coronal and axial T1-weighted images were obtained.    Contrast: Outside cervical MR 2/23/2007    Findings:  Cervical: The cervical vertebrae appear normally aligned.   Straightening of normal cervical lordosis. There is no disc height  narrowing at any level.  There is normal signal within and normal  contour of the cervical spinal cord.  There is no abnormal contrast  enhancement within the cervical spinal cord, thecal sac or vertebral  column.      Tiny left central inferiorly migrating extrusion with effacement of  the ventral subarachnoid space. No spinal canal or neural foraminal  stenosis.    No abnormality of the paraspinous soft tissues.    Brachial plexus: There are several T2 hyperintense, enhancing lesions  in bilateral anterior chest and level 4 reaching up to 1.5 cm in the  right chest. The largest lesion in the right appears  to abut the  brachial plexus. These could represent lymph node versus  neurofibromas.    The roots appear normal along their course. No definite mass or lesion  is noted in the adjacent lung apices.    Post-intravenous contrast images also demonstrate no abnormality of  the brachial plexus.      Impression    Impression:   1. Cervical spine: No abnormal enhancement in the spinal cord, thecal  sac or cervical vertebrae. No definite neurofibroma. No spinal canal  or neural foraminal stenosis.  2. Brachial plexus: No definite mass in the brachial plexus on either  side. Enhancing lesions in bilateral anterior chest and bilateral  level 4, could represent axillary lymph nodes versus neurofibromas.  The largest lesion on the right appears to abut the brachial plexus.    I have personally reviewed the examination and initial interpretation  and I agree with the findings.    ARMIDA SCHULTZ MD     Impression:  1. NF1  2. Bilateral ankle valgus, improved with orthotics.   3. Left cataract - not NF1 related.   4. Neck pain unrelated to NF1, improved with PT.     Plan:  1. RTC in 2 years or sooner as needed.   2. Continue to take methylphenidate medication twice daily without missed doses.   3. Referral to Dermatology.   4. Take Vitamin D3 capsules: 2000 units by mouth daily October through April, 1000 units daily May through Sept.    Time spent with patient 30 minutes.    This document serves as a record of the services and decisions personally performed and made by Baldomero Hernandez MD. It was created on his behalf by William Rodriguez, a trained medical scribe. The creation of this document is based on the provider's statements to the medical scribe.    The documentation recorded by the scribe accurately reflects the services I personally performed and the decisions made by me.    Baldomero Hernandez    It was a pleasure to see you in our Neurofibromatosis clinic today.  Here's our recommendations for  follow-up care:    Referrals/Tests:  Dermatology referral - you will get a call regarding this appt.    Other Instructions:    Vitamin D3 capsules: 2000 units by mouth daily October through April, 1000 units daily May through Sept.    Influenza vaccine every year in the fall.    Ophthalmology (eye MD) exam every year.    Annual physical with your Primary Care Provider.    Return to Clinic:  2 years, or sooner if needed.  You will get a reminder letter to schedule this appt.    ------------------------------------------------------------------------------------------------------------------------------    Neurofibromatosis () Clinic  [facility], [address]th [place] [address]  Scheduling/Appointments: [phone]  Fax: [phone]    Numbers to call:   Monday - Friday, 8:00 am - 5:00 pm:    Non-urgent or same-day call-back concerns: [facility] Nurse Triage - Voicemail: [phone]    Urgent concerns:  Care Coordinator - Darya House RN - Pager: [phone] (If you don't get a call back within 15-30 minutes, then Darya is off and you should call [phone]).    Scheduling/Appointments: [phone]  Nights and weekends:   Call [phone] and ask the  to page the 'Pediatric Heme/Onc fellow on call' if you have an urgent concern that can't wait until the clinic opens.  Genetic Counselor:  Lori Iraheta, [facility]: [phone]  Blanca Combs, [facility]: [phone]    Darya House RN, MS  NF Care Coordinator  Pager: [phone]  E-mail: [email or web address]        CC  Patient Care Team:  Sonny Lr as PCP - General (Internal Medicine)  Susan House, RN as Continuity Care Coordinator (Pediatric Hematology/Oncology)  Baldomero Hernandez MD as Referring Physician (Pediatric Hematology/Oncology)  Daphne Egan, CAIO CNP as Nurse Practitioner (Pediatric Hematology/Oncology)  Radha Hammond  Lawrence, PhD LP (Psychology)  YESICA SPRINGER    Copy to patient  DEBBIE PATINO  [address]  [place] [address]     Baldomero Hernandez MD

## 2018-02-28 NOTE — TELEPHONE ENCOUNTER
Guy Thornton was seen at the [facility] on 2/28/18 for hearing aid services.  He reported his left hearing aid wasn't working.  Examination revealed pinched earmold tubing.  The hearing aid and earmold were cleaned and the earmold tubing was replaced.  Afterwards the hearing aid was found to be functioning normally.  It was observed the battery door was broken but it did not affect the function of the hearing aid.  Mr. Thornton reported good sound quality from the hearing aid after today's work.  He and his mother were advised of the cost of a potential repair, which would be $350.00.  Guy will return to the clinic as needed.

## 2018-02-28 NOTE — PATIENT INSTRUCTIONS
It was a pleasure to see you in our Neurofibromatosis clinic today.  Here's our recommendations for follow-up care:    Referrals/Tests:  Dermatology referral - you will get a call regarding this appt.    Other Instructions:    Vitamin D3 capsules: 2000 units by mouth daily October through April, 1000 units daily May through Sept.    Influenza vaccine every year in the fall.    Ophthalmology (eye MD) exam every year.    Annual physical with your Primary Care Provider.    Return to Clinic:  2 years, or sooner if needed.  You will get a reminder letter to schedule this appt.    ------------------------------------------------------------------------------------------------------------------------------    Neurofibromatosis (NF) Clinic  [facility], 9th Floor - [address] [address]  Scheduling/Appointments: [phone]  Fax: [phone]    Numbers to call:   Monday - Friday, 8:00 am - 5:00 pm:    Non-urgent or same-day call-back concerns: [facility] Nurse Triage - Voicemail: [phone]    Urgent concerns: NF Care Coordinator - Darya House RN - Pager: [phone] (If you don't get a call back within 15-30 minutes, then Darya is off and you should call [phone]).    Scheduling/Appointments: [phone]  Nights and weekends:   Call [phone] and ask the  to page the 'Pediatric Heme/Onc fellow on call' if you have an urgent concern that can't wait until the clinic opens.  Genetic Counselor:  Lori Iraheta, [facility] [phone]  Blanca Combs, [facility]: [phone]    Darya House RN, MS  NF Care Coordinator  Pager: [phone]  E-mail: [email or web address]

## 2018-02-28 NOTE — PROGRESS NOTES
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    Since the last visit, Guy has been doing well. Mother had a recent upper respiratory illness but Guy stayed healthy. Guy reports that his foot orthotics from [facility] have helped with his bilateral ankle valgus. He attended PT at [facility] which helped relieve neck pain. He no longer experiences a \"tingling\" sensation in the fingers of his right hand. Guy has occasional allergic reactions to his new puppy, Yvonne. He reports that he has not had any recent headaches. Currently, Guy's ear is plugged with cerumen and he reports that he will require new hearing aids. Mother reports that Guy's vision has improved to 20/30 with left cataract. Guy has a stable benign nevus on his toe.     There are no other concerns or complaints at this time.     Fam/Soc: Guy attends college at [employer] in [place]. 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 Guy is currently taking a dance class. He will take a [employer] course and math course this coming semester. The family recently got a puppy named Yvonne.     History was obtained from patient and patient's mother.    Allergies   Allergen Reactions     Bacitracin Blisters     Chloral Hydrate Other (See Comments)     Patient becomes aggressive and hallucinations       Current Outpatient Prescriptions   Medication     methylphenidate (RITALIN) 5 MG tablet     albuterol (2.5 MG/3ML) 0.083% nebulizer solution     albuterol (PROAIR HFA, PROVENTIL HFA, VENTOLIN HFA) 108 (90 BASE) MCG/ACT inhaler     methylphenidate (RITALIN) 5 MG tablet     methylphenidate (RITALIN) 5 MG tablet     Cholecalciferol (VITAMIN D PO)     No current facility-administered medications for this visit.        Past Medical History:   Diagnosis Date     Asthma      Hearing loss of " "left ear      Neurofibromatosis (H)        Past Surgical History:   Procedure Laterality Date     BACK SURGERY       ear surgeries      3 tympanomastoidectomies       Family History   Problem Relation Age of Onset     Unknown/Adopted Mother      Unknown/Adopted Father      Unknown/Adopted Maternal Grandmother      Unknown/Adopted Maternal Grandfather      Review of Systems   HENT: Positive for hearing loss.         Genevieves ear is plugged with cerumen and he reports that he will require new hearing aids.    Eyes:        Vision has improved to 20/30 with left cataract.    Respiratory: Negative.    Cardiovascular: Negative.    Gastrointestinal: Negative.  Negative for constipation, diarrhea, nausea and vomiting.   Genitourinary: Negative.    Musculoskeletal: Negative for back pain and neck pain (Neck pain has resolved with PT. ).        Bilateral foot pain has improved with orthotics.    Skin: Negative.         Stable mole on right second toe.    Allergic/Immunologic:        Allergic to dogs.    Neurological: Negative.  Negative for numbness.   Psychiatric/Behavioral: Negative.    All other systems reviewed and are negative.      /73 (BP Location: Left arm, Patient Position: Fowlers, Cuff Size: Adult Large)  Pulse 70  Temp 98.8  F (37.1  C) (Oral)  Resp 20  Ht 1.702 m (5' 7.01\")  Wt 66.1 kg (145 lb 11.6 oz)  HC 59.4 cm  SpO2 100%  BMI 22.82 kg/m2  Physical Exam   Constitutional: He is oriented to person, place, and time and well-developed, well-nourished, and in no distress.   HENT:   Head: Normocephalic and atraumatic.   Right Ear: External ear normal.   Left Ear: External ear normal.   Minor scarring of left tympanic membrane. Dull right tympanic membrane.    Musculoskeletal:   Left sternocleidomastoid tightness    Neurological: He is alert and oriented to person, place, and time. GCS score is 15.   Skin: Skin is warm and dry.   Stable compared to previous visit.    Psychiatric: Mood and affect normal. "       Results for orders placed or performed during the hospital encounter of 06/28/17   MR Brachial Plexus w/o & w Contrast    Narrative    MR CERVICAL SPINE W/O & W CONTRAST, MR BRACHIAL PLEXUS W/O  & W CONTRAST 6/28/2017 7:39 AM    Provided History: evaluate c-spine for new &/or progressive  neurofibromas, Neurofibromatosis, type 1    Comparison: No similar prior studies    Technique:   Cervical: Sagittal T1-weighted, sagittal T2-weighted, sagittal  diffusion weighted, axial T2-weighted, and axial T2* gradient echo  images of the cervical spine were obtained without intravenous  contrast. Following intravenous administration of gadolinium, axial  and sagittal T1-weighted images with fat saturation were also  obtained.  Brachial plexus:Axial fat saturated T2-weighted, sagittal T1-weighted,  oblique coronal T1- and fat saturated T2-weighted images of the  brachial plexus bilaterally obtained without intravenous contrast.  After intravenous gadolinium administration, fat saturated sagital,  coronal and axial T1-weighted images were obtained.    Contrast: Outside cervical MR 2/23/2007    Findings:  Cervical: The cervical vertebrae appear normally aligned.   Straightening of normal cervical lordosis. There is no disc height  narrowing at any level.  There is normal signal within and normal  contour of the cervical spinal cord.  There is no abnormal contrast  enhancement within the cervical spinal cord, thecal sac or vertebral  column.      Tiny left central inferiorly migrating extrusion with effacement of  the ventral subarachnoid space. No spinal canal or neural foraminal  stenosis.    No abnormality of the paraspinous soft tissues.    Brachial plexus: There are several T2 hyperintense, enhancing lesions  in bilateral anterior chest and level 4 reaching up to 1.5 cm in the  right chest. The largest lesion in the right appears to abut the  brachial plexus. These could represent lymph node  versus  neurofibromas.    The roots appear normal along their course. No definite mass or lesion  is noted in the adjacent lung apices.    Post-intravenous contrast images also demonstrate no abnormality of  the brachial plexus.      Impression    Impression:   1. Cervical spine: No abnormal enhancement in the spinal cord, thecal  sac or cervical vertebrae. No definite neurofibroma. No spinal canal  or neural foraminal stenosis.  2. Brachial plexus: No definite mass in the brachial plexus on either  side. Enhancing lesions in bilateral anterior chest and bilateral  level 4, could represent axillary lymph nodes versus neurofibromas.  The largest lesion on the right appears to abut the brachial plexus.    I have personally reviewed the examination and initial interpretation  and I agree with the findings.    ARMIDA SCHULTZ MD     Impression:  1. NF1  2. Bilateral ankle valgus, improved with orthotics.   3. Left cataract - not NF1 related.   4. Neck pain unrelated to NF1, improved with PT.     Plan:  1. RTC in 2 years or sooner as needed.   2. Continue to take methylphenidate medication twice daily without missed doses.   3. Referral to Dermatology.   4. Take Vitamin D3 capsules: 2000 units by mouth daily October through April, 1000 units daily May through Sept.    Time spent with patient 30 minutes.    This document serves as a record of the services and decisions personally performed and made by Baldomero Hernandez MD. It was created on his behalf by William Rodriguez, a trained medical scribe. The creation of this document is based on the provider's statements to the medical scribe.    The documentation recorded by the scribe accurately reflects the services I personally performed and the decisions made by me.    Baldomero Hernandez    It was a pleasure to see you in our Neurofibromatosis clinic today.  Here's our recommendations for follow-up care:    Referrals/Tests:  Dermatology referral - you  will get a call regarding this appt.    Other Instructions:    Vitamin D3 capsules: 2000 units by mouth daily October through April, 1000 units daily May through Sept.    Influenza vaccine every year in the fall.    Ophthalmology (eye MD) exam every year.    Annual physical with your Primary Care Provider.    Return to Clinic:  2 years, or sooner if needed.  You will get a reminder letter to schedule this appt.    ------------------------------------------------------------------------------------------------------------------------------    Neurofibromatosis (NF) Clinic  [facility], 9th Floor - [address] [address]  Scheduling/Appointments: [phone]  Fax: [phone]    Numbers to call:   Monday - Friday, 8:00 am - 5:00 pm:    Non-urgent or same-day call-back concerns: [facility] Nurse Triage - Voicemail: [phone]    Urgent concerns:  Care Coordinator - Darya House RN - Pager: [phone] (If you don't get a call back within 15-30 minutes, then Darya is off and you should call [phone]).    Scheduling/Appointments: [phone]  Nights and weekends:   Call [phone] and ask the  to page the 'Pediatric Heme/Onc fellow on call' if you have an urgent concern that can't wait until the clinic opens.  Genetic Counselor:  Lori Iraheta, [facility]: [phone]  Blanca Combs, [facility]: [phone]    Darya House RN, MS  NF Care Coordinator  Pager: [phone]  E-mail: [email or web address]        CC  Patient Care Team:  Sonny Springer as PCP - General (Internal Medicine)  Susan House, RN as Continuity Care Coordinator (Pediatric Hematology/Oncology)  Baldomero Hernandez MD as Referring Physician (Pediatric Hematology/Oncology)  Daphne Egan, CAIO CNP as Nurse Practitioner (Pediatric Hematology/Oncology)  Radha Hammond, PhD LP (Psychology)  SONNY SPRINGER    Copy to  patient  DEBBIE CLARKKENNA  [address]  [place] [address]

## 2018-02-28 NOTE — MR AVS SNAPSHOT
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   After hours call [phone] and ask for the pediatric hematology / oncology physician to be paged for you.              Care Instructions    It was a pleasure to see you in our Neurofibromatosis clinic today.  Here's our recommendations for follow-up care:    Referrals/Tests:  Dermatology referral - you will get a call regarding this appt.    Other Instructions:    Vitamin D3 capsules: 2000 units by mouth daily October through April, 1000 units daily May through Sept.    Influenza vaccine every year in the fall.    Ophthalmology (eye MD) exam every year.    Annual physical with your Primary Care Provider.    Return to Clinic:  2 years, or sooner if needed.  You will get a reminder letter to schedule this appt.    ------------------------------------------------------------------------------------------------------------------------------    Neurofibromatosis (NF) Clinic  [facility], 9th Floor - [address] [place] [address]  Scheduling/Appointments: [phone]  Fax: [phone]    Numbers to call:   Monday - Friday, 8:00 am - 5:00 pm:    Non-urgent or same-day call-back concerns: [facility] Nurse Triage - Voicemail: [phone]    Urgent concerns: NF Care Coordinator - Darya House,  RN - Pager: [phone] (If you don't get a call back within 15-30 minutes, then Darya is off and you should call [phone]).    Scheduling/Appointments: [phone]  Nights and weekends:   Call [phone] and ask the  to page the 'Pediatric Heme/Onc fellow on call' if you have an urgent concern that can't wait until the clinic opens.  Genetic Counselor:  Lori Iraheta[place] [facility] [phone]  Blanca Combs, [facility]: [phone]    Darya House RN, MS  NF Care Coordinator  Pager: [phone]  E-mail: [email or web address]                  Follow-ups after your visit        Additional Services     DERMATOLOGY REFERRAL       Your provider has referred you to: [facility]: [facility] [phone] [email or web address]  Dr. Larios or Dr. Joaquin   Melanonychia and cutaneous nevi vs. neurofibroma  Please be aware that coverage of these services is subject to the terms and limitations of your health insurance plan.  Call member services at your health plan with any benefit or coverage questions.      Please bring the following with you to your appointment:    (1) Any X-Rays, CTs or MRIs which have been performed.  Contact the facility where they were done to arrange for  prior to your scheduled appointment.    (2) List of current medications  (3) This referral request   (4) Any documents/labs given to you for this referral                  Follow-up notes from your care team     Return in about 2 years (around 2/28/2020) for  Scheduling, Additional scheduling needed, Set Up Recall Appt, NF1 Follow-Up, Hem/Onc Clinic.      Future tests that were ordered for you today     Open Future Orders        Priority Expected Expires Ordered    DERMATOLOGY REFERRAL Routine 3/1/2018 2/28/2019 2/28/2018            Who to contact     Please call your clinic at [phone] to:    Ask questions about your health    Make or  "cancel appointments    Discuss your medicines    Learn about your test results    Speak to your doctor            Additional Information About Your Visit        [employer] Information     [employer] gives you secure access to your electronic health record. If you see a primary care provider, you can also send messages to your care team and make appointments. If you have questions, please call your primary care clinic.  If you do not have a primary care provider, please call [phone] and they will assist you.      [employer] is an electronic gateway that provides easy, online access to your medical records. With [employer], you can request a clinic appointment, read your test results, renew a prescription or communicate with your care team.     To access your existing account, please contact your [facility] or call [phone] for assistance.        Care EveryWhere ID     This is your Care EveryWhere ID. This could be used by other organizations to access your [place] medical records  [id number]        Your Vitals Were     Pulse Temperature Respirations Height Head Circumference Pulse Oximetry    70 98.8  F (37.1  C) (Oral) 20 1.702 m (5' 7.01\") 59.4 cm (23.4\") 100%    BMI (Body Mass Index)                   22.82 kg/m2            Blood Pressure from Last 3 Encounters:   02/28/18 124/73   06/28/17 122/75   06/06/17 130/84    Weight from Last 3 Encounters:   02/28/18 66.1 kg (145 lb 11.6 oz)   06/28/17 68.3 kg (150 lb 9.2 oz)   06/06/17 67.3 kg (148 lb 5.9 oz)               Primary Care Provider Office Phone # Fax #    Sonny Bernstein Adeline [phone]       [place] [address]        Equal Access to Services     NENITA PILLAI : Petr Conway, wapageda luqadaha, qaybta kaalmada roberta, paula weeks. So [facility] [phone].    ATENCIÓN: Si habla español, tiene a basilio disposición servicios gratuitos " de asistencia lingüística. Russel mensah [phone].    We comply with applicable federal civil rights laws and [place] laws. We do not discriminate on the basis of race, color, national origin, age, disability, sex, sexual orientation, or gender identity.            Thank you!     Thank you for choosing PEDS HEMATOLOGY ONCOLOGY  for your care. Our goal is always to provide you with excellent care. Hearing back from our patients is one way we can continue to improve our services. Please take a few minutes to complete the written survey that you may receive in the mail after your visit with us. Thank you!             Your Updated Medication List - Protect others around you: Learn how to safely use, store and throw away your medicines at www.disposemymeds.org.          This list is accurate as of 2/28/18  4:40 PM.  Always use your most recent med list.                   Brand Name Dispense Instructions for use Diagnosis    * albuterol 108 (90 BASE) MCG/ACT Inhaler    PROAIR HFA/PROVENTIL HFA/VENTOLIN HFA    1 Inhaler    Inhale 2 puffs into the lungs every 4 hours as needed    Intermittent asthma, uncomplicated       * albuterol (2.5 MG/3ML) 0.083% neb solution     75 mL    Take 1 vial (2.5 mg) by nebulization every 6 hours as needed for shortness of breath / dyspnea or wheezing    Intermittent asthma       * methylphenidate 5 MG tablet    RITALIN    60 tablet    Take 1 tablet (5 mg) by mouth 2 times daily in the AM and at lunchtime.    Neurofibromatosis, peripheral, NF1 (H)       * methylphenidate 5 MG tablet    RITALIN    60 tablet    Take 1 tablet (5 mg) by mouth 2 times daily in the AM and at lunchtime.    Neurofibromatosis, peripheral, NF1 (H)       * methylphenidate 5 MG tablet    RITALIN    60 tablet    Take 2 tablets (10 mg) by mouth 2 times daily In the AM and at lunchtime.    Neurofibromatosis, peripheral, NF1 (H)       VITAMIN D PO      Unit amount unknown, takes one tablet PO Q day when remembers        *  Notice:  This list has 5 medication(s) that are the same as other medications prescribed for you. Read the directions carefully, and ask your doctor or other care provider to review them with you.

## 2018-02-28 NOTE — NURSING NOTE
"Chief Complaint   Patient presents with     RECHECK     Patient is here today for NF1 follow up     /73 (BP Location: Left arm, Patient Position: Fowlers, Cuff Size: Adult Large)  Pulse 70  Temp 98.8  F (37.1  C) (Oral)  Resp 20  Ht 1.702 m (5' 7.01\")  Wt 66.1 kg (145 lb 11.6 oz)  SpO2 100%  BMI 22.82 kg/m2    Adrianne Mcdonald LPN  February 28, 2018    "

## 2018-05-15 ENCOUNTER — OFFICE VISIT (OUTPATIENT)
Dept: DERMATOLOGY | Facility: CLINIC | Age: 28
End: 2018-05-15
Attending: DERMATOLOGY
Payer: COMMERCIAL

## 2018-05-15 VITALS
HEART RATE: 81 BPM | HEIGHT: 67 IN | BODY MASS INDEX: 21.7 KG/M2 | WEIGHT: 138.23 LBS | DIASTOLIC BLOOD PRESSURE: 84 MMHG | SYSTOLIC BLOOD PRESSURE: 119 MMHG

## 2018-05-15 DIAGNOSIS — L70.0 ACNE VULGARIS: Primary | ICD-10-CM

## 2018-05-15 DIAGNOSIS — L60.8 MELANONYCHIA: ICD-10-CM

## 2018-05-15 DIAGNOSIS — Q85.01 NEUROFIBROMATOSIS, PERIPHERAL, NF1 (H): ICD-10-CM

## 2018-05-15 DIAGNOSIS — D22.9 MULTIPLE BENIGN MELANOCYTIC NEVI: ICD-10-CM

## 2018-05-15 PROCEDURE — G0463 HOSPITAL OUTPT CLINIC VISIT: HCPCS | Mod: ZF

## 2018-05-15 RX ORDER — TRETINOIN 0.25 MG/G
CREAM TOPICAL
Qty: 45 G | Refills: 3 | Status: SHIPPED | OUTPATIENT
Start: 2018-05-15

## 2018-05-15 NOTE — MR AVS SNAPSHOT
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 Shana Cerda, Dr. Albania Aguiar, Dr. Hetal Joaquin, Dr. Mariah Paulino, Dr. Velasquez Ball       Pediatric Appointment Scheduling and Call Center [phone]     Non Urgent -Triage Voicemail Line; [phone]- Nu and Aditi RN's. Messages are checked periodically throughout the day and are returned as soon as possible.      Clinic Fax number: [phone]    If you need a prescription refill, please contact your pharmacy. They will send us an electronic request. Refills are approved or denied by our Physicians during normal business hours, Monday through Fridays    Per office policy, refills will not be granted if you have not been seen within the past year (or sooner depending on your child's condition)    *Radiology Scheduling- [phone]  *Sedation Unit Scheduling- [phone]  *[facility] [place] Scheduling- General [phone]; Pediatric Dermatology [phone]  *Main  Services: [phone]   Icelandic: [phone]   South Sudanese: [phone]   Hmong/Peter/Marshallese: [phone]    For urgent matters that cannot wait until the next business day, is over a holiday and/or a weekend please call [phone] and ask for the Dermatology Resident On-Call to be paged.        For your acne:  1) Use benzoyl peroxide wash in the morning  2) Use tretinoin cream, pea-sized amount to entire face at night time      Pediatric Dermatology  [address]  [address]  [place] [address]  [phone]    Gentle Skin Care  Below is a list of products our providers recommend for gentle skin care.  Moisturizers:    Lighter; Cetaphil Cream, CeraVe, Aveeno and Vanicream Light     Thicker; Aquaphor Ointment, Vaseline, Petrolium Jelly, Eucerin and Vanicream    Avoid Lotions (too thin)  Mild Cleansers:    Dove- Fragrance Free    CeraVe     Vanicream Cleansing Bar    Cetaphil Cleanser     Aquaphor 2 in1 Gentle Wash and Shampoo       Laundry Products:    All Free and Clear    Cheer Free    Generic Brands are okay as long as they are  Fragrance Free      Avoid fabric softeners  and dryer sheets   Sunscreens: SPF 30 or greater     Sunscreens that contain Zinc Oxide or Titanium Dioxide should be applied, these are physical blockers. Spray or  chemical  sunscreens should be avoided.        Shampoo and Conditioners:    Free and Clear by Vanicream    Aquaphor 2 in 1 Gentle Wash and Shampoo    California Baby  super sensitive   Oils:    Mineral Oil     Emu Oil     For some patients, coconut and sunflower seed oil      Generic Products are an okay substitute, but make sure they are fragrance free.  *Avoid product that have fragrance added to them. Organic does not mean  fragrance free.  In fact patients with sensitive skin can become quite irritated by organic products.     1. Daily bathing is recommended. Make sure you are applying a good moisturizer after bathing every time.  2. Use Moisturizing creams at least twice daily to the whole body. Your provider may recommend a lighter or heavier moisturizer based on your child s severity and that time of year it is.  3. Creams are more moisturizing than lotions  4. Products should be fragrance free- soaps, creams, detergents.  Products such as Michael and Michael as well as the Cetaphil \"Baby\" line contain fragrance and may irritate your child's sensitive skin.    Care Plan:  1. Keep bathing and showering short, less than 15 minutes   2. Always " use lukewarm warm when possible. AVOID very HOT or COLD water  3. DO NOT use bubble bath  4. Limit the use of soaps. Focus on the skin folds, face, armpits, groin and feet  5. Do NOT vigorously scrub when you cleanse your skin  6. After bathing, PAT your skin lightly with a towel. DO NOT rub or scrub when drying  7. ALWAYS apply a moisturizer immediately after bathing. This helps to  lock in  the moisture. * IF YOU WERE PRESCRIBED A TOPICAL MEDICATION, APPLY YOUR MEDICATION FIRST THEN COVER WITH YOUR DAILY MOISTURIZER  8. Reapply moisturizing agents at least twice daily to your whole body  9. Do not use products such as powders, perfumes, or colognes on your skin  10. Avoid saunas and steam baths. This temperature is too HOT  11. Avoid tight or  scratchy  clothing such as wool  12. Always wash new clothing before wearing them for the first time  13. Sometimes a humidifier or vaporizer can be used at night can help the dry skin. Remember to keep it clean to avoid mold growth.                     Follow-ups after your visit        Follow-up notes from your care team     Return in about 6 months (around 11/15/2018).      Your next 10 appointments already scheduled     Nov 07, 2018  3:00 PM CST   Return Visit with Hetal Joaquin MD   Peds Dermatology ([facility])    [facility]  12th Floor  [address] [address] [phone]              Who to contact     Please call your clinic at [phone] to:    Ask questions about your health    Make or cancel appointments    Discuss your medicines    Learn about your test results    Speak to your doctor            Additional Information About Your Visit        MyChart Information     [employer] gives you secure access to your electronic health record. If you see a primary care provider, you can also send messages to your care team and make appointments. If you have questions, please call your primary care clinic.  If you do  "not have a primary care provider, please call [phone] and they will assist you.      [employer] is an electronic gateway that provides easy, online access to your medical records. With [employer], you can request a clinic appointment, read your test results, renew a prescription or communicate with your care team.     To access your existing account, please contact your [facility] or call [phone] for assistance.        Care EveryWhere ID     This is your Care EveryWhere ID. This could be used by other organizations to access your [facility] medical records  [id number]        Your Vitals Were     Pulse Height BMI (Body Mass Index)             81 5' 7.32\" (171 cm) 21.44 kg/m2          Blood Pressure from Last 3 Encounters:   05/15/18 119/84   02/28/18 124/73   06/28/17 122/75    Weight from Last 3 Encounters:   05/15/18 138 lb 3.7 oz (62.7 kg)   02/28/18 145 lb 11.6 oz (66.1 kg)   06/28/17 150 lb 9.2 oz (68.3 kg)              We Performed the Following     DERMATOLOGY REFERRAL          Today's Medication Changes          These changes are accurate as of 5/15/18 11:16 AM.  If you have any questions, ask your nurse or doctor.               Start taking these medicines.        Dose/Directions    benzoyl peroxide 5 % Liqd   Used for:  Acne vulgaris   Started by:  Hetal Joaquin MD        Use every morning as wash to face   Quantity:  226 g   Refills:  3       tretinoin 0.025 % cream   Commonly known as:  RETIN-A   Used for:  Acne vulgaris   Started by:  Hetal Joaquin MD        Use every night   Quantity:  45 g   Refills:  3            Where to get your medicines      These medications were sent to [id number] - [place], [place] - [address]  [address] [address]     Phone:  [phone]     benzoyl peroxide 5 % Liqd    tretinoin 0.025 % cream                Primary Care Provider Office Phone # Fax #    Sonny Lr " [phone]       [address] [address]        Equal Access to Services     NENITA PILLAI : Hadii aad ku hadedmundotomasa Conway, averyeloy lynnluanaha, tim daniejessica granados, paula acuna svetapako ricrado jose weeks. So [facility] [phone].    ATENCIÓN: Si habla español, tiene a basilio disposición servicios gratuitos de asistencia lingüística. Llame al [phone].    We comply with applicable federal civil rights laws and [place] laws. We do not discriminate on the basis of race, color, national origin, age, disability, sex, sexual orientation, or gender identity.            Thank you!     Thank you for choosing PEDS DERMATOLOGY  for your care. Our goal is always to provide you with excellent care. Hearing back from our patients is one way we can continue to improve our services. Please take a few minutes to complete the written survey that you may receive in the mail after your visit with us. Thank you!             Your Updated Medication List - Protect others around you: Learn how to safely use, store and throw away your medicines at www.disposemymeds.org.          This list is accurate as of 5/15/18 11:16 AM.  Always use your most recent med list.                   Brand Name Dispense Instructions for use Diagnosis    * albuterol 108 (90 Base) MCG/ACT Inhaler    PROAIR HFA/PROVENTIL HFA/VENTOLIN HFA    1 Inhaler    Inhale 2 puffs into the lungs every 4 hours as needed    Intermittent asthma, uncomplicated       * albuterol (2.5 MG/3ML) 0.083% neb solution     75 mL    Take 1 vial (2.5 mg) by nebulization every 6 hours as needed for shortness of breath / dyspnea or wheezing    Intermittent asthma       benzoyl peroxide 5 % Liqd     226 g    Use every morning as wash to face    Acne vulgaris       * methylphenidate 5 MG tablet    RITALIN    60 tablet    Take 1 tablet (5 mg) by mouth 2 times daily in the AM and at lunchtime.    Neurofibromatosis, peripheral, NF1 (H)        * methylphenidate 5 MG tablet    RITALIN    60 tablet    Take 1 tablet (5 mg) by mouth 2 times daily in the AM and at lunchtime.    Neurofibromatosis, peripheral, NF1 (H)       * methylphenidate 5 MG tablet    RITALIN    60 tablet    Take 2 tablets (10 mg) by mouth 2 times daily In the AM and at lunchtime.    Neurofibromatosis, peripheral, NF1 (H)       tretinoin 0.025 % cream    RETIN-A    45 g    Use every night    Acne vulgaris       VITAMIN D PO      Unit amount unknown, takes one tablet PO Q day when remembers        * Notice:  This list has 5 medication(s) that are the same as other medications prescribed for you. Read the directions carefully, and ask your doctor or other care provider to review them with you.

## 2018-05-15 NOTE — PATIENT INSTRUCTIONS
[facility]- Pediatric Dermatology  Dr. Shana Cerad, Dr. Albania Aguiar, Dr. Hetal Joaquin, Dr. Mariah Paulino, Dr. Velasquez Ball       Pediatric Appointment Scheduling and Call Center [phone]     Non Urgent -Triage Voicemail Line; [phone]- Nu and Aditi RN's. Messages are checked periodically throughout the day and are returned as soon as possible.      Clinic Fax number: [phone]    If you need a prescription refill, please contact your pharmacy. They will send us an electronic request. Refills are approved or denied by our Physicians during normal business hours, Monday through Fridays    Per office policy, refills will not be granted if you have not been seen within the past year (or sooner depending on your child's condition)    *Radiology Scheduling- [phone]  *Sedation Unit Scheduling- [phone]  *[facility] Scheduling- General [phone]; Pediatric Dermatology [phone]  *Main  Services: [phone]   Faroese: [phone]   Guatemalan: [phone]   Hmong/Bahraini/Jerrell: [phone]    For urgent matters that cannot wait until the next business day, is over a holiday and/or a weekend please call [phone] and ask for the Dermatology Resident On-Call to be paged.        For your acne:  1) Use benzoyl peroxide wash in the morning  2) Use tretinoin cream, pea-sized amount to entire face at night time      Pediatric Dermatology  [address]  [place] [address]  [phone]    Gentle Skin Care  Below is a list of products our providers recommend for gentle skin care.  Moisturizers:    Lighter; Cetaphil Cream, CeraVe, Aveeno and Vanicream Light     Thicker; Aquaphor Ointment, Vaseline, Petrolium Jelly, Eucerin and Vanicream    Avoid Lotions (too thin)  Mild Cleansers:    Dove- Fragrance Free    CeraVe     Vanicream Cleansing Bar    Cetaphil Cleanser     Aquaphor 2 in1 Gentle Wash and  "Shampoo       Laundry Products:    All Free and Clear    Cheer Free    Generic Brands are okay as long as they are  Fragrance Free      Avoid fabric softeners  and dryer sheets   Sunscreens: SPF 30 or greater     Sunscreens that contain Zinc Oxide or Titanium Dioxide should be applied, these are physical blockers. Spray or  chemical  sunscreens should be avoided.        Shampoo and Conditioners:    Free and Clear by Vanicream    Aquaphor 2 in 1 Gentle Wash and Shampoo    California Baby  super sensitive   Oils:    Mineral Oil     Emu Oil     For some patients, coconut and sunflower seed oil      Generic Products are an okay substitute, but make sure they are fragrance free.  *Avoid product that have fragrance added to them. Organic does not mean  fragrance free.  In fact patients with sensitive skin can become quite irritated by organic products.     1. Daily bathing is recommended. Make sure you are applying a good moisturizer after bathing every time.  2. Use Moisturizing creams at least twice daily to the whole body. Your provider may recommend a lighter or heavier moisturizer based on your child s severity and that time of year it is.  3. Creams are more moisturizing than lotions  4. Products should be fragrance free- soaps, creams, detergents.  Products such as Michael and Michael as well as the Cetaphil \"Baby\" line contain fragrance and may irritate your child's sensitive skin.    Care Plan:  1. Keep bathing and showering short, less than 15 minutes   2. Always use lukewarm warm when possible. AVOID very HOT or COLD water  3. DO NOT use bubble bath  4. Limit the use of soaps. Focus on the skin folds, face, armpits, groin and feet  5. Do NOT vigorously scrub when you cleanse your skin  6. After bathing, PAT your skin lightly with a towel. DO NOT rub or scrub when drying  7. ALWAYS apply a moisturizer immediately after bathing. This helps to  lock in  the moisture. * IF YOU WERE PRESCRIBED A TOPICAL MEDICATION, " APPLY YOUR MEDICATION FIRST THEN COVER WITH YOUR DAILY MOISTURIZER  8. Reapply moisturizing agents at least twice daily to your whole body  9. Do not use products such as powders, perfumes, or colognes on your skin  10. Avoid saunas and steam baths. This temperature is too HOT  11. Avoid tight or  scratchy  clothing such as wool  12. Always wash new clothing before wearing them for the first time  13. Sometimes a humidifier or vaporizer can be used at night can help the dry skin. Remember to keep it clean to avoid mold growth.

## 2018-05-15 NOTE — NURSING NOTE
"Chief Complaint   Patient presents with     Consult     Here today for Socquies      /84 (BP Location: Right arm, Patient Position: Sitting, Cuff Size: Adult Regular)  Pulse 81  Ht 5' 7.32\" (171 cm)  Wt 138 lb 3.7 oz (62.7 kg)  BMI 21.44 kg/m2  Kelli Carson LPN    "

## 2018-05-15 NOTE — LETTER
5/15/2018      RE: Guy Thornton  [address]  [place] [address]       Pediatric Dermatology New Clinic Visit    Referring Physician: Sonny Lr   CC: Evaluate for neurofibromas and melanonychia    HPI:   We had the pleasure of seeing Guy in our Pediatric Dermatology clinic today, in consultation from Sonny Lr for evaluation of neurofibromas and melanonychia. He is here with his mom.     Guy is a 28 yo with neurofibromatosis type I. His mom and he have several concerns today, including evaluating his skin for neurofibromas and evaluating melanonychia of his fingers and particularly his toes. Mom and patient have not noticed significant growth to the pigmentary changes on his toes. He has no pain or bleeding associated with it. He also has acne and uses a neutrogena bar soap to try to control it. He has dry skin and tends to shower daily, using a lot of bar soap. Occasionally uses aveeno lotion. Otherwise in his usual state of health.      Past Medical/Surgical History:   1) Neurofibromatosis 1  2) History of injury to left flank (danced in shower and cut himself on soap tray)    Family History: Unknown as adopted  Social History: Adopted. Lives at home with mother. Goes to [employer] in [place] and is learning dance    Medications:   Current Outpatient Prescriptions   Medication Sig Dispense Refill     albuterol (2.5 MG/3ML) 0.083% nebulizer solution Take 1 vial (2.5 mg) by nebulization every 6 hours as needed for shortness of breath / dyspnea or wheezing (Patient not taking: Reported on 5/15/2018) 75 mL 0     albuterol (PROAIR HFA, PROVENTIL HFA, VENTOLIN HFA) 108 (90 BASE) MCG/ACT inhaler Inhale 2 puffs into the lungs every 4 hours as needed (Patient not taking: Reported on 5/15/2018) 1 Inhaler 2     Cholecalciferol (VITAMIN D PO) Unit amount unknown, takes one tablet PO Q day when remembers       methylphenidate (RITALIN) 5 MG tablet Take 1 tablet (5 mg) by mouth 2 times  "daily in the AM and at lunchtime. 60 tablet 0     methylphenidate (RITALIN) 5 MG tablet Take 1 tablet (5 mg) by mouth 2 times daily in the AM and at lunchtime. 60 tablet 0     methylphenidate (RITALIN) 5 MG tablet Take 2 tablets (10 mg) by mouth 2 times daily In the AM and at lunchtime. 60 tablet 0      Allergies:   Allergies   Allergen Reactions     Bacitracin Blisters     Chloral Hydrate Other (See Comments)     Patient becomes aggressive and hallucinations      ROS: a 10 point review of systems including constitutional, HEENT, CV, GI, musculoskeletal, Neurologic, Endocrine, Respiratory, Hematologic and Allergic/Immunologic was performed and was negative  Physical examination: /84 (BP Location: Right arm, Patient Position: Sitting, Cuff Size: Adult Regular)  Pulse 81  Ht 5' 7.32\" (171 cm)  Wt 138 lb 3.7 oz (62.7 kg)  BMI 21.44 kg/m2   General: Well-developed, well-nourished in no apparent distress.   HEENT: Eyelids and conjunctivae normal.  Neck was supple, with thyroid not palpable.   PULM: Patient was breathing comfortably on room air.   CV: Extremities were warm and well-perfused without edema. There was no clubbing or cyanosis, nails normal.    GI: No abdominal organomegaly.   PSYCH: Normal mood and affect.    Skin: A complete skin examination and palpation of skin and subcutaneous tissues of the scalp, eyebrows, face, chest, back, abdomen, groin and upper and lower extremities was performed and was normal except as noted below:  - On the right nasal side wall, right shoulder, right lower back, left shoulder are skin colored to brown, fleshy 2-3mm superficial papules  - Many uniform dark brown patches on the upper extremities and trunk  - Axillary freckling bilaterally  - Right third toe with a 9mm band of uniform melanonychia at lateral nail  - Right fifth toe with a 5mm band of uniform melanonychia at lateral nail  - Right fourth toe with a 1.5mm band of uniform melanonychia at middle nail  - Left " third toe with a 2mm band of uniform melanonychia at lateral nail  - Forehead with thickening and scattered comedones                    Assessment/Plan:  1) Neurofibromatosis Type 1  2) Multiple benign melanocytic nevi, including several toenails  3) Melanonychia in several toenails  On clinical exam today, patient does not have any evidence cutaneous neurofibromas. The papules that we can see are more consistent with compound melanocytic nevi. He does have >6 cafe au lait macules and axillary freckling consistent with his diagnosis of NF1. The broader melanonychia on his toes are consistent with benign melanocytic nevi arising within the nail matrix. These have benign features today. He does have some melanonychia on his fingernails that is more typical of ethnic variation. He is not on any medications that would lead to melanonychia. We have taken pictures today to document these findings, but are reassured that they are benign. We discussed changes to watch for including rapid growth, nail dystrophy, pigment within the proximal nailfold. Should any such changes occur, this would prompt timely re-evaluation.    4) Acne vulgaris  Predominantly comedonal. Cutaneous thickening likely from sebaceous hyperplasia, but may have component of inflammatory acne as well.  - BPO 5% wash qAM  - Tretinoin 0.025% cream qhs (discussed dryness, irritation, photosensitivity)     RTC 6 months and if stable then yearly.    Thank you for allowing us to participate in Guy's care.    Patient care seen and discussed with Dr. Emani Keyes MD  PGY5 Med-Derm  [phone]    I have personally examined this patient and agree with the resident's documentation and plan of care.  I have reviewed and amended the resident's note above.  The documentation accurately reflects my clinical observations, diagnoses, treatment and follow-up plans.     Hetal Joaquin MD

## 2018-05-15 NOTE — PROGRESS NOTES
Pediatric Dermatology New Clinic Visit    Referring Physician: Sonny Lr   CC: Evaluate for neurofibromas and melanonychia    HPI:   We had the pleasure of seeing Guy in our Pediatric Dermatology clinic today, in consultation from Sonny Lr for evaluation of neurofibromas and melanonychia. He is here with his mom.     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 His mom and he have several concerns today, including evaluating his skin for neurofibromas and evaluating melanonychia of his fingers and particularly his toes. Mom and patient have not noticed significant growth to the pigmentary changes on his toes. He has no pain or bleeding associated with it. He also has acne and uses a neutrogena bar soap to try to control it. He has dry skin and tends to shower daily, using a lot of bar soap. Occasionally uses aveeno lotion. Otherwise in his usual state of health.      Past Medical/Surgical History:   1) Neurofibromatosis 1  2) History of injury to left flank (danced in shower and cut himself on soap tray)    Family History: Unknown as adopted  Social History: Adopted. Lives at home with mother. Goes to [employer] in [place] and is learning dance    Medications:   Current Outpatient Prescriptions   Medication Sig Dispense Refill     albuterol (2.5 MG/3ML) 0.083% nebulizer solution Take 1 vial (2.5 mg) by nebulization every 6 hours as needed for shortness of breath / dyspnea or wheezing (Patient not taking: Reported on 5/15/2018) 75 mL 0     albuterol (PROAIR HFA, PROVENTIL HFA, VENTOLIN HFA) 108 (90 BASE) MCG/ACT inhaler Inhale 2 puffs into the lungs every 4 hours as needed (Patient not taking: Reported on 5/15/2018) 1 Inhaler 2     Cholecalciferol (VITAMIN D PO) Unit amount unknown, takes one tablet PO Q day when remembers       methylphenidate (RITALIN) 5 MG tablet Take 1 tablet (5 mg) by mouth 2 times daily in the AM and at lunchtime. 60 tablet 0     methylphenidate (RITALIN) 5 MG  "tablet Take 1 tablet (5 mg) by mouth 2 times daily in the AM and at lunchtime. 60 tablet 0     methylphenidate (RITALIN) 5 MG tablet Take 2 tablets (10 mg) by mouth 2 times daily In the AM and at lunchtime. 60 tablet 0      Allergies:   Allergies   Allergen Reactions     Bacitracin Blisters     Chloral Hydrate Other (See Comments)     Patient becomes aggressive and hallucinations      ROS: a 10 point review of systems including constitutional, HEENT, CV, GI, musculoskeletal, Neurologic, Endocrine, Respiratory, Hematologic and Allergic/Immunologic was performed and was negative  Physical examination: /84 (BP Location: Right arm, Patient Position: Sitting, Cuff Size: Adult Regular)  Pulse 81  Ht 5' 7.32\" (171 cm)  Wt 138 lb 3.7 oz (62.7 kg)  BMI 21.44 kg/m2   General: Well-developed, well-nourished in no apparent distress.   HEENT: Eyelids and conjunctivae normal.  Neck was supple, with thyroid not palpable.   PULM: Patient was breathing comfortably on room air.   CV: Extremities were warm and well-perfused without edema. There was no clubbing or cyanosis, nails normal.    GI: No abdominal organomegaly.   PSYCH: Normal mood and affect.    Skin: A complete skin examination and palpation of skin and subcutaneous tissues of the scalp, eyebrows, face, chest, back, abdomen, groin and upper and lower extremities was performed and was normal except as noted below:  - On the right nasal side wall, right shoulder, right lower back, left shoulder are skin colored to brown, fleshy 2-3mm superficial papules  - Many uniform dark brown patches on the upper extremities and trunk  - Axillary freckling bilaterally  - Right third toe with a 9mm band of uniform melanonychia at lateral nail  - Right fifth toe with a 5mm band of uniform melanonychia at lateral nail  - Right fourth toe with a 1.5mm band of uniform melanonychia at middle nail  - Left third toe with a 2mm band of uniform melanonychia at lateral nail  - Forehead " with thickening and scattered comedones                    Assessment/Plan:  1) Neurofibromatosis Type 1  2) Multiple benign melanocytic nevi, including several toenails  3) Melanonychia in several toenails  On clinical exam today, patient does not have any evidence cutaneous neurofibromas. The papules that we can see are more consistent with compound melanocytic nevi. He does have >6 cafe au lait macules and axillary freckling consistent with his diagnosis of NF1. The broader melanonychia on his toes are consistent with benign melanocytic nevi arising within the nail matrix. These have benign features today. He does have some melanonychia on his fingernails that is more typical of ethnic variation. He is not on any medications that would lead to melanonychia. We have taken pictures today to document these findings, but are reassured that they are benign. We discussed changes to watch for including rapid growth, nail dystrophy, pigment within the proximal nailfold. Should any such changes occur, this would prompt timely re-evaluation.    4) Acne vulgaris  Predominantly comedonal. Cutaneous thickening likely from sebaceous hyperplasia, but may have component of inflammatory acne as well.  - BPO 5% wash qAM  - Tretinoin 0.025% cream qhs (discussed dryness, irritation, photosensitivity)     RTC 6 months and if stable then yearly.    Thank you for allowing us to participate in uGy's care.    Patient care seen and discussed with Dr. Emani Keyes MD  PGY5 Med-Derm  [phone]    I have personally examined this patient and agree with the resident's documentation and plan of care.  I have reviewed and amended the resident's note above.  The documentation accurately reflects my clinical observations, diagnoses, treatment and follow-up plans.     Hetal Joaquin MD  , Pediatric Dermatology

## 2018-07-23 ENCOUNTER — TELEPHONE (OUTPATIENT)
Dept: PEDIATRIC HEMATOLOGY/ONCOLOGY | Facility: CLINIC | Age: 28
End: 2018-07-23
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
## 2019-01-07 ENCOUNTER — TELEPHONE (OUTPATIENT)
Dept: AUDIOLOGY | Facility: CLINIC | Age: 29
End: 2019-01-07

## 2019-01-09 NOTE — TELEPHONE ENCOUNTER
Guy Thornton was seen at the [facility] on 1/7/19 for hearing aid services.  He reported his left hearing aid was squealing while in his ear.  Earmold tubing was replaced and a vent plug was added to the earmold.  Guy reported a comfortable fit and good sound quality after today's work.  Feedback appeared to be minimized or eliminated with the addition of the vent plug.  It was determined that the earmold is likely 10+ years old.  Guy was advised to schedule an appointment at his convenience to obtain a new earmold.

## 2019-06-14 ENCOUNTER — TRANSFERRED RECORDS (OUTPATIENT)
Dept: HEALTH INFORMATION MANAGEMENT | Facility: CLINIC | Age: 29
End: 2019-06-14

## 2019-06-19 ENCOUNTER — ANESTHESIA (OUTPATIENT)
Dept: SURGERY | Facility: CLINIC | Age: 29
End: 2019-06-19
Payer: COMMERCIAL

## 2019-06-19 ENCOUNTER — ANESTHESIA EVENT (OUTPATIENT)
Dept: SURGERY | Facility: CLINIC | Age: 29
End: 2019-06-19
Payer: COMMERCIAL

## 2019-06-19 ENCOUNTER — HOSPITAL ENCOUNTER (OUTPATIENT)
Facility: CLINIC | Age: 29
Discharge: HOME | End: 2019-06-19
Attending: OPHTHALMOLOGY | Admitting: OPHTHALMOLOGY
Payer: COMMERCIAL

## 2019-06-19 VITALS
WEIGHT: 143 LBS | RESPIRATION RATE: 12 BRPM | HEART RATE: 67 BPM | OXYGEN SATURATION: 100 % | DIASTOLIC BLOOD PRESSURE: 74 MMHG | SYSTOLIC BLOOD PRESSURE: 118 MMHG | HEIGHT: 67 IN | BODY MASS INDEX: 22.44 KG/M2 | TEMPERATURE: 97.9 F

## 2019-06-19 PROCEDURE — 71000004 ZZH RECOVERY EYE PHASE 1 LEVEL 1 FIRST HR: Performed by: OPHTHALMOLOGY

## 2019-06-19 PROCEDURE — 25000566 ZZH SEVOFLURANE, EA 15 MIN: Performed by: OPHTHALMOLOGY

## 2019-06-19 PROCEDURE — 37000009 ZZH ANESTHESIA TECHNICAL FEE, EACH ADDTL 15 MIN: Performed by: OPHTHALMOLOGY

## 2019-06-19 PROCEDURE — 25800030 ZZH RX IP 258 OP 636: Performed by: ANESTHESIOLOGY

## 2019-06-19 PROCEDURE — V2632 POST CHMBR INTRAOCULAR LENS: HCPCS | Performed by: OPHTHALMOLOGY

## 2019-06-19 PROCEDURE — 37000008 ZZH ANESTHESIA TECHNICAL FEE, 1ST 30 MIN: Performed by: OPHTHALMOLOGY

## 2019-06-19 PROCEDURE — 40000170 ZZH STATISTIC PRE-PROCEDURE ASSESSMENT II: Performed by: OPHTHALMOLOGY

## 2019-06-19 PROCEDURE — 25000128 H RX IP 250 OP 636: Performed by: OPHTHALMOLOGY

## 2019-06-19 PROCEDURE — 36000102 ZZH EYE SURGERY LEVEL 3 EA 15 ADDTL MIN: Performed by: OPHTHALMOLOGY

## 2019-06-19 PROCEDURE — 71000028 ZZH EYE RECOVERY PHASE 2 EACH 15 MINS: Performed by: OPHTHALMOLOGY

## 2019-06-19 PROCEDURE — 25800030 ZZH RX IP 258 OP 636: Performed by: NURSE ANESTHETIST, CERTIFIED REGISTERED

## 2019-06-19 PROCEDURE — 25000125 ZZHC RX 250: Performed by: OPHTHALMOLOGY

## 2019-06-19 PROCEDURE — 25000128 H RX IP 250 OP 636: Performed by: NURSE ANESTHETIST, CERTIFIED REGISTERED

## 2019-06-19 PROCEDURE — 36000101 ZZH EYE SURGERY LEVEL 3 1ST 30 MIN: Performed by: OPHTHALMOLOGY

## 2019-06-19 PROCEDURE — 27210794 ZZH OR GENERAL SUPPLY STERILE: Performed by: OPHTHALMOLOGY

## 2019-06-19 PROCEDURE — 25000125 ZZHC RX 250: Performed by: NURSE ANESTHETIST, CERTIFIED REGISTERED

## 2019-06-19 DEVICE — EYE IMP IOL AMO PCL TECNIS ZCB00 21.5: Type: IMPLANTABLE DEVICE | Site: EYE | Status: FUNCTIONAL

## 2019-06-19 RX ORDER — ONDANSETRON 4 MG/1
4 TABLET, ORALLY DISINTEGRATING ORAL EVERY 30 MIN PRN
Status: DISCONTINUED | OUTPATIENT
Start: 2019-06-19 | End: 2019-06-19 | Stop reason: HOSPADM

## 2019-06-19 RX ORDER — TROPICAMIDE 10 MG/ML
1 SOLUTION/ DROPS OPHTHALMIC
Status: COMPLETED | OUTPATIENT
Start: 2019-06-19 | End: 2019-06-19

## 2019-06-19 RX ORDER — ACETAMINOPHEN 650 MG/1
650 SUPPOSITORY RECTAL EVERY 4 HOURS PRN
Status: DISCONTINUED | OUTPATIENT
Start: 2019-06-19 | End: 2019-06-19 | Stop reason: HOSPADM

## 2019-06-19 RX ORDER — LIDOCAINE 40 MG/G
CREAM TOPICAL
Status: DISCONTINUED | OUTPATIENT
Start: 2019-06-19 | End: 2019-06-19 | Stop reason: HOSPADM

## 2019-06-19 RX ORDER — SODIUM CHLORIDE, SODIUM LACTATE, POTASSIUM CHLORIDE, CALCIUM CHLORIDE 600; 310; 30; 20 MG/100ML; MG/100ML; MG/100ML; MG/100ML
INJECTION, SOLUTION INTRAVENOUS CONTINUOUS
Status: DISCONTINUED | OUTPATIENT
Start: 2019-06-19 | End: 2019-06-19 | Stop reason: HOSPADM

## 2019-06-19 RX ORDER — PROPOFOL 10 MG/ML
INJECTION, EMULSION INTRAVENOUS CONTINUOUS PRN
Status: DISCONTINUED | OUTPATIENT
Start: 2019-06-19 | End: 2019-06-19

## 2019-06-19 RX ORDER — PROPARACAINE HYDROCHLORIDE 5 MG/ML
1 SOLUTION/ DROPS OPHTHALMIC ONCE
Status: COMPLETED | OUTPATIENT
Start: 2019-06-19 | End: 2019-06-19

## 2019-06-19 RX ORDER — FENTANYL CITRATE 50 UG/ML
25-50 INJECTION, SOLUTION INTRAMUSCULAR; INTRAVENOUS
Status: DISCONTINUED | OUTPATIENT
Start: 2019-06-19 | End: 2019-06-19 | Stop reason: HOSPADM

## 2019-06-19 RX ORDER — NALOXONE HYDROCHLORIDE 0.4 MG/ML
.1-.4 INJECTION, SOLUTION INTRAMUSCULAR; INTRAVENOUS; SUBCUTANEOUS
Status: DISCONTINUED | OUTPATIENT
Start: 2019-06-19 | End: 2019-06-19 | Stop reason: HOSPADM

## 2019-06-19 RX ORDER — ALBUTEROL SULFATE 0.83 MG/ML
2.5 SOLUTION RESPIRATORY (INHALATION) EVERY 4 HOURS PRN
Status: DISCONTINUED | OUTPATIENT
Start: 2019-06-19 | End: 2019-06-19 | Stop reason: HOSPADM

## 2019-06-19 RX ORDER — DICLOFENAC SODIUM 1 MG/ML
1 SOLUTION/ DROPS OPHTHALMIC
Status: COMPLETED | OUTPATIENT
Start: 2019-06-19 | End: 2019-06-19

## 2019-06-19 RX ORDER — FENTANYL CITRATE 50 UG/ML
INJECTION, SOLUTION INTRAMUSCULAR; INTRAVENOUS PRN
Status: DISCONTINUED | OUTPATIENT
Start: 2019-06-19 | End: 2019-06-19

## 2019-06-19 RX ORDER — DEXAMETHASONE SODIUM PHOSPHATE 4 MG/ML
INJECTION, SOLUTION INTRA-ARTICULAR; INTRALESIONAL; INTRAMUSCULAR; INTRAVENOUS; SOFT TISSUE PRN
Status: DISCONTINUED | OUTPATIENT
Start: 2019-06-19 | End: 2019-06-19

## 2019-06-19 RX ORDER — ONDANSETRON 2 MG/ML
4 INJECTION INTRAMUSCULAR; INTRAVENOUS EVERY 30 MIN PRN
Status: DISCONTINUED | OUTPATIENT
Start: 2019-06-19 | End: 2019-06-19 | Stop reason: HOSPADM

## 2019-06-19 RX ORDER — PROPARACAINE HYDROCHLORIDE 5 MG/ML
1 SOLUTION/ DROPS OPHTHALMIC ONCE
Status: DISCONTINUED | OUTPATIENT
Start: 2019-06-19 | End: 2019-06-19 | Stop reason: HOSPADM

## 2019-06-19 RX ORDER — BRIMONIDINE TARTRATE 2 MG/ML
SOLUTION/ DROPS OPHTHALMIC PRN
Status: DISCONTINUED | OUTPATIENT
Start: 2019-06-19 | End: 2019-06-19 | Stop reason: HOSPADM

## 2019-06-19 RX ORDER — MOXIFLOXACIN 5 MG/ML
1 SOLUTION/ DROPS OPHTHALMIC
Status: COMPLETED | OUTPATIENT
Start: 2019-06-19 | End: 2019-06-19

## 2019-06-19 RX ORDER — EPHEDRINE SULFATE 50 MG/ML
INJECTION, SOLUTION INTRAMUSCULAR; INTRAVENOUS; SUBCUTANEOUS PRN
Status: DISCONTINUED | OUTPATIENT
Start: 2019-06-19 | End: 2019-06-19

## 2019-06-19 RX ORDER — HYDROMORPHONE HYDROCHLORIDE 1 MG/ML
.3-.5 INJECTION, SOLUTION INTRAMUSCULAR; INTRAVENOUS; SUBCUTANEOUS EVERY 10 MIN PRN
Status: DISCONTINUED | OUTPATIENT
Start: 2019-06-19 | End: 2019-06-19 | Stop reason: HOSPADM

## 2019-06-19 RX ORDER — LIDOCAINE HYDROCHLORIDE 10 MG/ML
INJECTION, SOLUTION EPIDURAL; INFILTRATION; INTRACAUDAL; PERINEURAL PRN
Status: DISCONTINUED | OUTPATIENT
Start: 2019-06-19 | End: 2019-06-19 | Stop reason: HOSPADM

## 2019-06-19 RX ORDER — BALANCED SALT SOLUTION 6.4; .75; .48; .3; 3.9; 1.7 MG/ML; MG/ML; MG/ML; MG/ML; MG/ML; MG/ML
SOLUTION OPHTHALMIC PRN
Status: DISCONTINUED | OUTPATIENT
Start: 2019-06-19 | End: 2019-06-19 | Stop reason: HOSPADM

## 2019-06-19 RX ORDER — PHENYLEPHRINE HYDROCHLORIDE 25 MG/ML
1 SOLUTION/ DROPS OPHTHALMIC
Status: COMPLETED | OUTPATIENT
Start: 2019-06-19 | End: 2019-06-19

## 2019-06-19 RX ORDER — LIDOCAINE HYDROCHLORIDE 20 MG/ML
INJECTION, SOLUTION INFILTRATION; PERINEURAL PRN
Status: DISCONTINUED | OUTPATIENT
Start: 2019-06-19 | End: 2019-06-19

## 2019-06-19 RX ORDER — ONDANSETRON 2 MG/ML
INJECTION INTRAMUSCULAR; INTRAVENOUS PRN
Status: DISCONTINUED | OUTPATIENT
Start: 2019-06-19 | End: 2019-06-19

## 2019-06-19 RX ORDER — PROPOFOL 10 MG/ML
INJECTION, EMULSION INTRAVENOUS PRN
Status: DISCONTINUED | OUTPATIENT
Start: 2019-06-19 | End: 2019-06-19

## 2019-06-19 RX ORDER — MEPERIDINE HYDROCHLORIDE 25 MG/ML
12.5 INJECTION INTRAMUSCULAR; INTRAVENOUS; SUBCUTANEOUS
Status: DISCONTINUED | OUTPATIENT
Start: 2019-06-19 | End: 2019-06-19 | Stop reason: HOSPADM

## 2019-06-19 RX ADMIN — POVIDONE-IODINE 1 DROP: 5 SOLUTION OPHTHALMIC at 13:07

## 2019-06-19 RX ADMIN — DICLOFENAC SODIUM 1 DROP: 1 SOLUTION OPHTHALMIC at 13:07

## 2019-06-19 RX ADMIN — PROPOFOL 200 MG: 10 INJECTION, EMULSION INTRAVENOUS at 13:35

## 2019-06-19 RX ADMIN — SODIUM CHLORIDE, POTASSIUM CHLORIDE, SODIUM LACTATE AND CALCIUM CHLORIDE: 600; 310; 30; 20 INJECTION, SOLUTION INTRAVENOUS at 13:24

## 2019-06-19 RX ADMIN — TROPICAMIDE 1 DROP: 10 SOLUTION/ DROPS OPHTHALMIC at 13:11

## 2019-06-19 RX ADMIN — DEXAMETHASONE SODIUM PHOSPHATE 4 MG: 4 INJECTION, SOLUTION INTRA-ARTICULAR; INTRALESIONAL; INTRAMUSCULAR; INTRAVENOUS; SOFT TISSUE at 13:40

## 2019-06-19 RX ADMIN — TROPICAMIDE 1 DROP: 10 SOLUTION/ DROPS OPHTHALMIC at 13:07

## 2019-06-19 RX ADMIN — PHENYLEPHRINE HYDROCHLORIDE 1 DROP: 25 SOLUTION/ DROPS OPHTHALMIC at 13:07

## 2019-06-19 RX ADMIN — DEXMEDETOMIDINE HYDROCHLORIDE 20 MCG: 100 INJECTION, SOLUTION INTRAVENOUS at 13:39

## 2019-06-19 RX ADMIN — MOXIFLOXACIN 1 DROP: 5 SOLUTION/ DROPS OPHTHALMIC at 13:07

## 2019-06-19 RX ADMIN — ONDANSETRON 4 MG: 2 INJECTION INTRAMUSCULAR; INTRAVENOUS at 13:40

## 2019-06-19 RX ADMIN — PROPOFOL 50 MCG/KG/MIN: 10 INJECTION, EMULSION INTRAVENOUS at 13:39

## 2019-06-19 RX ADMIN — PHENYLEPHRINE HYDROCHLORIDE 50 MCG: 10 INJECTION INTRAVENOUS at 13:54

## 2019-06-19 RX ADMIN — PHENYLEPHRINE HYDROCHLORIDE 100 MCG: 10 INJECTION INTRAVENOUS at 13:48

## 2019-06-19 RX ADMIN — DICLOFENAC SODIUM 1 DROP: 1 SOLUTION OPHTHALMIC at 13:16

## 2019-06-19 RX ADMIN — MOXIFLOXACIN 1 DROP: 5 SOLUTION/ DROPS OPHTHALMIC at 13:16

## 2019-06-19 RX ADMIN — PROPARACAINE HYDROCHLORIDE 1 DROP: 5 SOLUTION/ DROPS OPHTHALMIC at 13:07

## 2019-06-19 RX ADMIN — LIDOCAINE HYDROCHLORIDE 40 MG: 20 INJECTION, SOLUTION INFILTRATION; PERINEURAL at 13:35

## 2019-06-19 RX ADMIN — TROPICAMIDE 1 DROP: 10 SOLUTION/ DROPS OPHTHALMIC at 13:16

## 2019-06-19 RX ADMIN — DICLOFENAC SODIUM 1 DROP: 1 SOLUTION OPHTHALMIC at 13:10

## 2019-06-19 RX ADMIN — PHENYLEPHRINE HYDROCHLORIDE 50 MCG: 10 INJECTION INTRAVENOUS at 13:51

## 2019-06-19 RX ADMIN — Medication 2.5 MG: at 13:54

## 2019-06-19 RX ADMIN — PROPOFOL 50 MG: 10 INJECTION, EMULSION INTRAVENOUS at 13:37

## 2019-06-19 RX ADMIN — SODIUM CHLORIDE, POTASSIUM CHLORIDE, SODIUM LACTATE AND CALCIUM CHLORIDE: 600; 310; 30; 20 INJECTION, SOLUTION INTRAVENOUS at 14:49

## 2019-06-19 RX ADMIN — MOXIFLOXACIN 1 DROP: 5 SOLUTION/ DROPS OPHTHALMIC at 13:10

## 2019-06-19 RX ADMIN — PHENYLEPHRINE HYDROCHLORIDE 1 DROP: 25 SOLUTION/ DROPS OPHTHALMIC at 13:16

## 2019-06-19 RX ADMIN — FENTANYL CITRATE 25 MCG: 50 INJECTION, SOLUTION INTRAMUSCULAR; INTRAVENOUS at 13:35

## 2019-06-19 RX ADMIN — PROPOFOL 50 MG: 10 INJECTION, EMULSION INTRAVENOUS at 13:36

## 2019-06-19 RX ADMIN — MIDAZOLAM 1 MG: 1 INJECTION INTRAMUSCULAR; INTRAVENOUS at 13:31

## 2019-06-19 RX ADMIN — PHENYLEPHRINE HYDROCHLORIDE 1 DROP: 25 SOLUTION/ DROPS OPHTHALMIC at 13:11

## 2019-06-19 ASSESSMENT — ENCOUNTER SYMPTOMS
SEIZURES: 0
ORTHOPNEA: 0

## 2019-06-19 ASSESSMENT — MIFFLIN-ST. JEOR: SCORE: 1587.27

## 2019-06-19 NOTE — ANESTHESIA POSTPROCEDURE EVALUATION
Patient: Guy Thornton    Procedure(s):  LEFT EYE CATARACT EXTRACTION WITH INTRAOCULAR LENS IMPLANT    Diagnosis:CATARACT  Diagnosis Additional Information: No value filed.    Anesthesia Type:  General, LMA    Note:  Anesthesia Post Evaluation    Patient location during evaluation: PACU  Patient participation: Able to fully participate in evaluation  Level of consciousness: sleepy but conscious and responsive to verbal stimuli  Pain management: adequate  Airway patency: patent  Cardiovascular status: acceptable and hemodynamically stable  Respiratory status: acceptable and unassisted  Hydration status: acceptable  PONV: none     Anesthetic complications: None          Last vitals:  Vitals:    06/19/19 1304 06/19/19 1417 06/19/19 1430   BP: 116/71 98/53 94/54   Pulse:  68 67   Resp: 16 16 14   Temp: 36.6  C (97.8  F) 36.6  C (97.9  F)    SpO2: 100% 99% 98%         Electronically Signed By: Abel Barry MD  June 19, 2019  2:38 PM

## 2019-06-19 NOTE — ANESTHESIA CARE TRANSFER NOTE
Patient: Guy Thornton    Procedure(s):  LEFT EYE CATARACT EXTRACTION WITH INTRAOCULAR LENS IMPLANT    Diagnosis: CATARACT  Diagnosis Additional Information: No value filed.    Anesthesia Type:   General, LMA     Note:  Airway :Face Mask and Oral Airway  Patient transferred to:PACU  Comments: At end of procedure, spontaneous respirations, adequate tidal volumes, followed commands to voice, LMA removed atraumatically, oropharynx suctioned, airway patent after LMA removal. Oxygen via facemask at 6 liters per minute to PACU. Oxygen tubing connected to wall O2 in PACU, SpO2, NiBP, and EKG monitors and alarms on and functioning, Mita Hugger warmer connected to patient gown, report on patient's clinical status given to PACU RN, RN questions answeredHandoff Report: Identifed the Patient, Identified the Reponsible Provider, Reviewed the pertinent medical history, Discussed the surgical course, Reviewed Intra-OP anesthesia mangement and issues during anesthesia, Set expectations for post-procedure period and Allowed opportunity for questions and acknowledgement of understanding      Vitals: (Last set prior to Anesthesia Care Transfer)    CRNA VITALS  6/19/2019 1349 - 6/19/2019 1419      6/19/2019             Resp Rate (observed): 10    Resp Rate (set):  10                Electronically Signed By: CAIO Holden CRNA  June 19, 2019  2:19 PM

## 2019-06-19 NOTE — DISCHARGE INSTRUCTIONS
[facility] Discharge  Instructions  Anesthesia ([facility])   Adult Discharge Instructions (General)    For 24 hours after surgery    1. Get plenty of rest.  Make arrangements to have a responsible adult stay with you for at least 24 hours.  2. Do not drive or use heavy equipment for 24 hours.    3. Do not drink alcohol for 24 hours.  4. Do not sign legal documents or make important decisions for 24 hours.  5. Avoid strenuous or risky activities. You may feel lightheaded.  If so, sit for a few minutes before standing.  Have someone help you get up.   6. General anesthesia patients should drink only clear liquids (apple juice, ginger ale, broth or 7-Up). Be sure to drink enough fluids.  Move to a regular diet as you feel able.  7. Any questions of medical nature, call your physician.    [facility]  Post Operative Care  Following Cataract Surgery     ? If you have a gauze eye patch on, please do not remove it until it is removed by your physician at your first post-operative visit.    ? You may remove the clear eye shield and begin eye drops when you get home.    ? Wear the eye shield when sleeping for protection until your doctor stops its use.    ? Do not rub the operated eye.    ? Light sensitivity may be noticed. Sunglasses may be worn for comfort.    ? Some discomfort and irritation may be noticed. Acetaminophen (Tylenol) or Ibuprofen (Advil) may be taken for discomfort.    ? For irritation or minor discomfort, you may also use a clean cold washcloth held directly on the eye for 10 minute time periods, as needed.    ? Avoid bending over, strenuous activity or heavy lifting until approved by your doctor.    ? Keep the operated eye dry.    ? You may wash your hair, bathe or shower, but keep the operated eye closed while doing so.    ? Use medication exactly as prescribed by your doctor.  You may restart your regular home medications.    ? Bring all materials  and medications to the clinic on your first post-operative visit.    ? Call the doctor s office if any of the following should occur:  -  any sudden vision change  -  nausea or a severe headache  -  increase in pain not controlled  -  increased amount of floaters (black spots in front of vision)  -  or signs of infection (pus, increasing redness or tenderness)

## 2019-06-19 NOTE — ANESTHESIA PREPROCEDURE EVALUATION
Anesthesia Pre-Procedure Evaluation    Patient: Guy Thornton   MRN: [id number] : 1990          Preoperative Diagnosis: CATARACT    Procedure(s):  LEFT EYE PHACOEMULSIFICATION, CATARACT, CLEAR CORNEAL INCISION APPROACH, WITH STANDARD IOL INSERTION (GENERAL ANESTHESIA) (TRYPAN BLUE, TENO- NYLON SUTURE)    Past Medical History:   Diagnosis Date     Asthma      Hearing loss of left ear      Neurofibromatosis (H)      Past Surgical History:   Procedure Laterality Date     BACK SURGERY       ear surgeries      3 tympanomastoidectomies       Anesthesia Evaluation     . Pt has had prior anesthetic.     No history of anesthetic complications          ROS/MED HX    ENT/Pulmonary:     (+)Intermittent asthma , . .   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      [place], ADHD, denies pain     (-) seizures, CVA and TIA   Cardiovascular:     (+) Dyslipidemia, ----. : . . . :. .      (-) hypertension, CHF, WALLACE and orthopnea/PND   METS/Exercise Tolerance:  >4 METS   Hematologic:         Musculoskeletal: Comment: Scoliosis         GI/Hepatic:        (-) GERD   Renal/Genitourinary:         Endo:         Psychiatric:         Infectious Disease:         Malignancy:         Other:    (+) no H/O Chronic Pain,                        Physical Exam  Normal systems: dental    Airway   Mallampati: II  TM distance: >3 FB  Neck ROM: full    Dental     Cardiovascular   Rhythm and rate: regular      Pulmonary    breath sounds clear to auscultation            Lab Results   Component Value Date    GLC 83 10/01/2012       Preop Vitals  BP Readings from Last 3 Encounters:   05/15/18 119/84   18 124/73   17 122/75    Pulse Readings from Last 3 Encounters:   05/15/18 81   18 70   17 64      Resp Readings from Last 3 Encounters:   18 20   17 20   17 16    SpO2 Readings from  "Last 3 Encounters:   02/28/18 100%   06/28/17 100%   06/01/16 96%      Temp Readings from Last 1 Encounters:   02/28/18 37.1  C (98.8  F) (Oral)    Ht Readings from Last 1 Encounters:   05/15/18 1.71 m (5' 7.32\")      Wt Readings from Last 1 Encounters:   05/15/18 62.7 kg (138 lb 3.7 oz)    Estimated body mass index is 21.44 kg/m  as calculated from the following:    Height as of 5/15/18: 1.71 m (5' 7.32\").    Weight as of 5/15/18: 62.7 kg (138 lb 3.7 oz).     Allergies   Allergen Reactions     Bacitracin Blisters     Chloral Hydrate Other (See Comments)     Patient becomes aggressive and hallucinations     Social History     Tobacco Use     Smoking status: Never Smoker     Smokeless tobacco: Never Used   Substance Use Topics     Alcohol use: No     Prior to Admission medications    Medication Sig Start Date End Date Taking? Authorizing Provider   methylphenidate (RITALIN) 5 MG tablet Take 1 tablet (5 mg) by mouth 2 times daily in the AM and at lunchtime. 10/25/15  Yes Carlos Eduardo Stover MD PhD   methylphenidate (RITALIN) 5 MG tablet Take 1 tablet (5 mg) by mouth 2 times daily in the AM and at lunchtime. 11/24/15  Yes Carlos Eduardo Stover MD PhD   albuterol (2.5 MG/3ML) 0.083% nebulizer solution Take 1 vial (2.5 mg) by nebulization every 6 hours as needed for shortness of breath / dyspnea or wheezing  Patient not taking: Reported on 5/15/2018 11/4/15   Carlos Eduardo Stover MD PhD   albuterol (PROAIR HFA, PROVENTIL HFA, VENTOLIN HFA) 108 (90 BASE) MCG/ACT inhaler Inhale 2 puffs into the lungs every 4 hours as needed  Patient not taking: Reported on 5/15/2018 9/25/15   Carlos Eduardo Stover MD PhD   benzoyl peroxide 5 % LIQD Use every morning as wash to face 5/15/18   Hetal Joaquin MD   Cholecalciferol (VITAMIN D PO) Unit amount unknown, takes one tablet PO Q day when remembers    Reported, Patient   methylphenidate (RITALIN) 5 MG tablet Take 2 tablets (10 mg) by mouth 2 times daily In the AM and at lunchtime. 6/1/16   Baldomero Hernandez " MD Zoë   tretinoin (RETIN-A) 0.025 % cream Use every night 5/15/18   Hetal Joaquin MD     Current Facility-Administered Medications Ordered in Epic   Medication Dose Route Frequency Last Rate Last Dose     lactated ringers infusion   Intravenous Continuous 25 mL/hr at 06/19/19 1324       lidocaine (AKTEN) ophthalmic gel 0.5 mL  0.5 mL Ophthalmic Once         lidocaine (LMX4) cream   Topical Q1H PRN         lidocaine 1 % 0.1-1 mL  0.1-1 mL Other Q1H PRN         proparacaine (ALCAINE) 0.5 % ophthalmic solution 1 drop  1 drop Ophthalmic Once         sodium chloride (PF) 0.9% PF flush 3 mL  3 mL Intracatheter q1 min prn         sodium chloride (PF) 0.9% PF flush 3 mL  3 mL Intracatheter Q8H         No current [facility]-ordered outpatient medications on file.       lactated ringers 25 mL/hr at 06/19/19 1324     Recent Labs   Lab Test 10/01/12  0739   GLC 83     No results for input(s): WBC, HGB, PLT in the last 36129 hours.  No results for input(s): ABO, RH in the last 60445 hours.  No results for input(s): TROPI in the last 84410 hours.  No results for input(s): PH, PCO2, PO2, HCO3 in the last 95525 hours.  No results for input(s): HCG in the last 14150 hours.  No results found for this or any previous visit (from the past 744 hour(s)).    RECENT LABS:       Anesthesia Plan      History & Physical Review  History and physical reviewed and following examination; no interval change.    ASA Status:  2 .        Plan for General and LMA   PONV prophylaxis:  Ondansetron (or other 5HT-3) and Dexamethasone or Solumedrol  propofol infusion      Postoperative Care      Consents  Anesthetic plan, risks, benefits and alternatives discussed with:  Patient..                 Nancy Siddiqi MD

## 2019-06-20 NOTE — OP NOTE
Procedure Date: 2019      PREOPERATIVE DIAGNOSIS:  Visually significant cataract, left eye.      POSTOPERATIVE DIAGNOSIS:  Visually significant cataract, left eye.      PROCEDURE PERFORMED:  Phacoemulsification with posterior chamber lens implant, left eye.      COMPLICATIONS:  Zero.      ANESTHESIA:  General.      DESCRIPTION OF PROCEDURE:  After the left eye was identified and dilated in the preoperative area, the patient was brought to the operating suite where a standard prep and drape was performed.  A standard timeout was performed.  General anesthesia was induced prior to the prep and drape.  The operating microscope was positioned over the left eye and a clear corneal paracentesis at the 2 o'clock meridian was performed.  The anterior chamber was filled with viscoelastic, and a clear corneal incision at the 9 o'clock meridian was performed.  A cystotome was used to initiate a capsulorrhexis and this was completed using Utrata forceps.  Hydrodissection was carried out.  Phacoemulsification was used to remove the nucleus and the remaining subcapsular cortical material was removed using automated irrigation and aspiration.  Amvisc Plus was used to inflate the capsule and a 21.5 diopter lens was inserted into the capsule.  The remaining viscoelastic was aspirated using automated irrigation and aspiration.  The clear corneal wound was sutured with a 10-0 nylon suture due to the risk of this patient rubbing his eyes.  One drop of Iopidine was administered and the patient was de-draped and discharged to recovery unit in stable condition having tolerated the procedure well.         KRISSY PLUNKETT MD             D: 2019   T: 2019   MT:       Name:     DEBBIE PATINO   MRN:      [id number]        Account:        [id number]   :      1990           Procedure Date: 2019      Document: [id number]

## 2019-09-04 ENCOUNTER — OFFICE VISIT (OUTPATIENT)
Dept: PEDIATRIC HEMATOLOGY/ONCOLOGY | Facility: CLINIC | Age: 29
End: 2019-09-04
Attending: PEDIATRICS
Payer: COMMERCIAL

## 2019-09-04 VITALS
OXYGEN SATURATION: 100 % | SYSTOLIC BLOOD PRESSURE: 129 MMHG | TEMPERATURE: 97.7 F | BODY MASS INDEX: 21.7 KG/M2 | HEART RATE: 86 BPM | DIASTOLIC BLOOD PRESSURE: 80 MMHG | RESPIRATION RATE: 16 BRPM | WEIGHT: 138.23 LBS | HEIGHT: 67 IN

## 2019-09-04 DIAGNOSIS — Q85.01 NEUROFIBROMATOSIS, PERIPHERAL, NF1 (H): Primary | ICD-10-CM

## 2019-09-04 PROCEDURE — G0463 HOSPITAL OUTPT CLINIC VISIT: HCPCS | Mod: ZF

## 2019-09-04 ASSESSMENT — ENCOUNTER SYMPTOMS
SLEEP DISTURBANCE: 0
HEADACHES: 0
APPETITE CHANGE: 0
EYE DISCHARGE: 0

## 2019-09-04 ASSESSMENT — PAIN SCALES - GENERAL: PAINLEVEL: NO PAIN (0)

## 2019-09-04 ASSESSMENT — MIFFLIN-ST. JEOR: SCORE: 1560.75

## 2019-09-04 NOTE — LETTER
NEUROFIBROMATOSIS PROGRAM      [facility], 12th Floor  [address] [address]  Phone: [phone]  FAX: [phone]   November 1, 2019     To whom it may concern,     Mr. Guy Thornton, son of Maisha Thornton, has been under our care for Neruofibromatosis type 1, a life-long genetic condition associated with a range of problems across the lifespan, including neuropsychological impairments and medical complications such as: growth of benign nerve tumors anywhere in the body; high blood pressure and other vascular problems; and an increased risk for certain types of cancer. Although Guy has not suffered serious NF1-related medical complications during his childhood years, he remains at risk for the aforementioned health problems during adulthood.  Guy has been affected by some mild to moderate NF1-related neuro-cognitive and psychosocial impairments that have impacted his learning, communication, and independence along the way. Ongoing evaluation, management and support for these challenges will be important throughout Guy's adult life.     NF1 is associatedwith an 8-15-year reduction in average life expectancy in both men and women, primarily due to NF1-related cancers and cardiovascular problems. Although routine health issues can often be managed by an astute and well-informed internist or family physician, the American College of Medical Genetics and Genomics has recommended ongoing evaluation by and care coordination with a specialized NF clinic for all adults with NF1. This recommendation is based on expert analysis of peer-reviewed medical publications, particularly those derived from population-based and multi-institution cohorts, which has documented the importance of early detection and specialized/targeted treatment of NF1-associated cancers, plexiform neurofibromas, cardiovascular problems and other complications.  DR José, AZAR Young, Geoffrey,  Jorgito MORGAN DA, Lyn WHITNEY. Care of adults with neurofibromatosis type 1: a clinical practice resource of the American College [employer] and [employer] (ACMG). 2018; Genetics in Medicine. https://rdcu.be/MFHJ     Guy will continue to need annual follow-up in the NF Clinic at the [facility] during adulthood, and may require additional visits if NF1-related health issues arise that require specialized evaluation and management. Thank you for your ongoing support of Guy's visits to our clinic.     Sincerely,     Baldomero Hernandez MD   Medical Director, Pediatric Neuro-oncology and Neurofibromatosis programs   Co-medical director, [facility] Pediatric Hematology Oncology        Darya House MS, RN   Neurofibromatosis Care Coordinator   [facility] Neurofibromatosis Program     Copy to patient  Guy Thornton/[place] Norberto  [address] [facility] [address]         RE: Guy Thornton    MRN: [id number]   : 1990   ENC DATE: Sep 4, 2019   PAGE:

## 2019-09-04 NOTE — LETTER
9/4/2019      RE: Guy Thornton  [address]  [place] [address]          Pediatric Hematology/Oncology Clinic Note     HPI-  Guy Thornton is a 29 year old male with NF1 who presents to the clinic with his adoptive mother for a follow up.    Guy is concerned with recent forgetfulness. He will forget to grab things on his way out the door or put things down and forget where he put them. He will also forget to do things that have been in his daily routine for years. His mother reports that [employer] organization is very minimal. He tries to use a planner as well as the calendar on his phone to keep his schedule straight. They aren't sure if the forgetfulness is associated with the ADHD medication or not. The forgetfulness is causing lots of frustration at home with his family.    His neck pain and tingling in his fingers has decreased since we last saw him. Guy and his mother report that Guy has diminished feeling in his skin. He has burned his forearm cooking and didn't notice until he saw the burn. Instances like this have occurred a few times. He is really heavy handed with their new puppy when he doesn't mean to be. This last weekend, the family took out their carpet which they realized was on top of a really sticky substance. Guy's mother felt pain when peeling her feet off of the substance, but Guy was walking around on it with no problem.    The cataract surgery went really well. His vision is much better.    Guy is followed by Dr. Joaquin in dermatology.    He is currently taking Ritalin at 5 mg twice a day for ADHD. He occasionally forgets to take it. Before creating an alarm to remember the medication, Guy would forget all the time, but now remembers most times. This medication is managed by Dr. Sonny Lr.    Guy's mother also wants a new letter that states it would be best for Guy's treatment to remain on her insurance.     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 He is in his 3rd year at [employer]. Guy enjoys running and riding his bike for physical activity. The family has recently gotten an 11 week old causey retriever in addition to their other dog.     History was obtained from Guy and his adoptive mother.    Allergies   Allergen Reactions     Bacitracin Blisters     Chloral Hydrate Other (See Comments)     Patient becomes aggressive and hallucinations     Current Outpatient Medications   Medication     albuterol (2.5 MG/3ML) 0.083% nebulizer solution     albuterol (PROAIR HFA, PROVENTIL HFA, VENTOLIN HFA) 108 (90 BASE) MCG/ACT inhaler     benzoyl peroxide 5 % LIQD     Cholecalciferol (VITAMIN D PO)     methylphenidate (RITALIN) 5 MG tablet     methylphenidate (RITALIN) 5 MG tablet     methylphenidate (RITALIN) 5 MG tablet     tretinoin (RETIN-A) 0.025 % cream     No current facility-administered medications for this visit.      Past Medical History:   Diagnosis Date     Asthma      Hearing loss of left ear      Neurofibromatosis (H)      Past Surgical History:   Procedure Laterality Date     BACK SURGERY       ear surgeries      3 tympanomastoidectomies     PHACOEMULSIFICATION CLEAR CORNEA WITH STANDARD INTRAOCULAR LENS IMPLANT Left 6/19/2019    Procedure: LEFT EYE CATARACT EXTRACTION WITH INTRAOCULAR LENS IMPLANT;  Surgeon: Triston Barr MD;  Location: [facility]     Family History   Problem Relation Age of Onset     Unknown/Adopted Mother      Unknown/Adopted Father      Unknown/Adopted Maternal Grandmother      Unknown/Adopted Maternal Grandfather      Review of Systems   Constitutional: Negative for appetite change.        Mom thinks his clothes are baggier and she is slightly concerned that he has lost weight.   HENT: Positive for hearing loss (Hearing is good with hearing aid in left ear. Right ear is at the bottom of the normal range for hearing. Due for an ENT visit.).         Left ear is blocked with cerumen. Itched so  "much that his ear bled, but this has since stopped.   Eyes: Negative for discharge.        Had cataract surgery that went well. Vision is much better since.   Genitourinary: Negative.    Skin:        He has some spots of lightening on the sides of his nose. He is planning on seeing a dermatologist for this.  Dark color of the toenails has remained stable.  Acne is well managed.   Neurological: Negative for headaches.        See HPI.   Psychiatric/Behavioral: Negative for sleep disturbance.   All other systems reviewed and are negative.    /80 (BP Location: Left arm, Patient Position: Chair, Cuff Size: Adult Regular)   Pulse 86   Temp 97.7  F (36.5  C) (Oral)   Resp 16   Ht 1.702 m (5' 7.01\")   Wt 62.7 kg (138 lb 3.7 oz)   SpO2 100%   BMI 21.64 kg/m       Physical Exam   Constitutional: He is oriented to person, place, and time. He appears well-developed.   HENT:   Scratch in left ear, no evidence of otitis.  Impacted cerumen bilaterally.   Neurological: He is alert and oriented to person, place, and time.   Skin: Skin is warm and dry.   Psychiatric: He has a normal mood and affect.     Impression:  1. NF1  2. Bilateral ankle valgus, improved with orthotics      Plan:  1. RTC in 1 year for follow up, or sooner PRN.  2. We will contact neuropsychology so you can complete repeat testing.  3. Follow up with dermatology.  4. I will write you a new letter for the insurance company.   5. Try Debrox for ear wax removal.    Time spent with patient 40 minutes.    This document serves as a record of the services and decisions personally performed and made by Baldomero Hernandez MD. It was created on his behalf by Sherice hendricks trained medical scribe. The creation of this document is based on the provider's statements to the medical scribe.    The documentation recorded by the scribe accurately reflects the services I personally performed and the decisions made by me.    Baldomero Camp " David MONTOYA  Patient Care Team:  Yesica Lr as PCP - General (Internal Medicine)  Susan House RN as Continuity Care Coordinator (Pediatric Hematology/Oncology)  Baldomero Hernandez MD as Referring Physician (Pediatric Hematology/Oncology)  Daphne Egan APRN CNP as Nurse Practitioner (Pediatric Hematology/Oncology)  Radha Hammond, PhD LP (Psychology)  Schwab, Briana, [place] as Nurse Coordinator  YESICA LR    Copy to patient  DEBBIE PATINO  [address] [address]    Thank you for choosing [facility]!   It was a pleasure to see you in our Neurofibromatosis Clinic today.  [email or web address]    Here's our recommendations for follow-up care:    Referrals/Tests/Plan:  Darya will look into options for formal ADHD evaluation and get back to you about this.  We will send you a letter for your mom's insurance company to specify that your medical specialty care for NF1 is an ongoing, lifetime need.  We will schedule you for a follow-up appointment with Dr. Munguia in dermatology.  We will send a letter to your PCP to suggest a trial of long-acting methylphenidate CD, with short-acting methylphenidate as needed for evening homework/studying  REturn in 6 months for follow-up.    Resources for NF1 and ADHD      These 2 links take you to a list of handouts/brochures about NF; including information for parents and teachers about educational challenges for kids with NF1:      The Children's Tumor Foundation is a non-profit 501(c)(3) medical foundation, dedicated to improving the health and well being of individuals and families affected by neurofibromatosis.  http://www.ctf.org/Patient-Information/Patient-Information-Brochures.html    The NF Network (founded in 1988 as Neurofibromatosis, Inc.) serves as an umbrella to facilitate collaboration on national activities and a networking of  shared resources.   https://nfnetwork.org/resources/educational-materials/    Here are 4 excellent websites for ADHD info:  1. Children and Adults with Attention-Deficit/Hyperactivity Disorder ([place]): http://www.africa.org/  2. The National Resource Center on ADHD (NR): A Program of [place]. The national clearinghouse for the latest evidence-based information on ADHD. http://bvou2jwqs.org/  3. KidsHealth: http://kidshealth.org/parent/dictionary/a/az-adhd.html  4. ADDVANCE; Answers to You Questions about AD/HD http://www.Awesomi.com/    ADHD Medication Info:    There are two 'families' of stimulant medications approved to treat ADHD symptoms: amphetamine stimulants and methylphenidate stimulants.    There is no test available to determine which family of stimulant medication would work better for a particular person, therefore, in the absence of familial history, 'trial and error' is the current approach to medication treatment for ADHD.    Lack of response to an amphetamine stimulant does not rule out the possibility of response to a methylphenidate stimulant, and vice versa.    Studies in children with ADHD have not shown either family of stimulants to be more effective than the other, however, most studies of stimulant use in children with NF1 have involved treatment with methylphenidate stimulants.    We prefer to avoid the slightly increased cardiovascular risk associated with amphetamine stimulants because people with NF1 have a higher risk of cardiovascular health problems over the lifespan compared to the general population.    The medication we often suggest for school-age children with NF1/ADHD is methylphenidate CD 'Metadate CD' for several reasons:  1. Methlyphenidate stimulants have been well-studied in children with NF1 (as opposed to amphetamine stimulants, which are not often chosen for first-line treatment in patients with NF1).  2. In the methylphenidate stimulant family, 'Metadate CD' has a shorter  duration of action (8-10 hours) compared to 'Concerta' (10-12 hours). A shorter duration is preferable for younger school-age children, who may not need extended coverage in the late afternoon/early evening. This slightly shorter duration also allows for earlier appetite recovery (kids eat more at dinner, reducing the risks associated with appetite suppression).   3. 'Metadate CD' has a stable level in the blood through the day, which gradually tapers off in the late afternoon/early evening, rather than the 'peaks and valleys' seen with other long-acting methylphenidate formulations. This maximizes the medication effect during the school day, and minimizes mood swings as the blood level falls later in the day.   4. If there is a concern for potential misuse of a child's methylphenidate prescription by  household members or close contacts, an alternative is a methylphenidate skin patch called Daytrana - this patch is available in different dose levels, and is placed on the child's skin each morning and removed in the late afternoon or early evening.    Additional resources for medication info:  ADHD Parents Medication Guide (2013): http://www.parentsmedguide.org/pmg_adhd.html  This guide covers nearly every question a parent might have about medication for ADHD (except the med chart doesn't tell you how many hours each medication lasts):     Medications Used in the Treatment of AD/HD (from [facility]): http://www.ytai0obcz.org/documents/Medchart.pdf  This chart includes how many hours each medication lasts.    Other Instructions:    Ophthalmology (eye MD) exam every year, or as recommended by your specialist    Annual physical with your Primary Care Provider    Influenza vaccine every year in the fall    Use Broad-Spectrum sunscreen SPF 15-30 from April through September    Call if you develop any of the following:    New or worsening headaches    Vision changes    Elevated blood pressure    Rapidly growing or painful  lump    New or worsening pain, numbness, tingling or weakness    New or worsening heartburn, abdominal pain, or change in bowel movements    Any other new or concerning symptom you would like to discuss    ------------------------------------------------------------------------------------------------------------------------------    Neurofibromatosis (NF) Clinic  [facility], 9th Floor - [address] [address]  Fax: [phone]   Scheduling/Appointments: [phone]  Selma Gottlieb    Phone: [phone]   Services: [phone]   Infusion Center/Lab: [phone]   Radiology/[phone] (Pediatrics) / [phone] (Adults)  Pediatric Specialty Call Center: [phone]  Adult Specialty Call Center: [phone]     Concerns or questions for your care team:  Monday - Friday, 8:00 am - 5:00 pm:    Non-urgent concerns: Voicemail: [phone]    Urgent concerns: [facility]: [phone].  Nights and weekends:   Call [phone] and ask the  to page the 'Pediatric Hematology/Oncology fellow on call' if you have an urgent concern that can't wait until the clinic opens.    ------------------------------------------------------------------------------------------------------------------------------    Neurofibromatosis Team  Baldomero Hernandez MD - Director, Neurofibromatosis/Pediatric Neuro-Oncology  Marcelina López MD - Pediatric Genetics  Triston Clark MD - Pediatric Genetics  Daphne Egan, CNP, APRN - Pediatric Neuro-Oncology  Darya House MS, RN - NF Care Coordinator  Voicemail: [phone]  Pager: [phone]  E-mail: [email or web address]  Nina Jimenez RN - Tumor Care Coordinator     Voicemail: [phone]  BELLO Pop, [facility] -      Phone: [phone]  Sherice Arechiga [facility] - Genetic Counselor  Phone: [phone]  Selma Francis -   Phone:  [phone]    Baldomero Hernandez MD

## 2019-09-04 NOTE — PATIENT INSTRUCTIONS
Thank you for choosing [facility]!   It was a pleasure to see you in our Neurofibromatosis Clinic today.  [email or web address]    Here's our recommendations for follow-up care:    Referrals/Tests/Plan:  Darya will look into options for formal ADHD evaluation and get back to you about this.  We will send you a letter for your mom's insurance company to specify that your medical specialty care for NF1 is an ongoing, lifetime need.  We will schedule you for a follow-up appointment with Dr. Munguia in dermatology.  We will send a letter to your PCP to suggest a trial of long-acting methylphenidate CD, with short-acting methylphenidate as needed for evening homework/studying  REturn in 6 months for follow-up.    Resources for NF1 and ADHD      These 2 links take you to a list of handouts/brochures about NF; including information for parents and teachers about educational challenges for kids with NF1:      The Children's Tumor Foundation is a non-profit 501(c)(3) medical foundation, dedicated to improving the health and well being of individuals and families affected by neurofibromatosis.  http://www.ctf.org/Patient-Information/Patient-Information-Brochures.html    The NF Network (founded in 1988 as Neurofibromatosis, Inc.) serves as an umbrella to facilitate collaboration on national activities and a networking of shared resources.   https://nfnetwork.org/resources/educational-materials/    Here are 4 excellent websites for ADHD info:  1. Children and Adults with Attention-Deficit/Hyperactivity Disorder ([place]): http://www.africa.org/  2. The National Resource Center on ADHD (NRC): A Program of [place]. The national clearinghouse for the latest evidence-based information on ADHD. http://apps2htwn.org/  3. KidsHealth: http://kidshealth.org/parent/dictionary/a/az-adhd.html  4. ADDVANCE; Answers to You Questions about AD/HD  http://www.Vivastream.com/    ADHD Medication Info:    There are two 'families' of stimulant medications approved to treat ADHD symptoms: amphetamine stimulants and methylphenidate stimulants.    There is no test available to determine which family of stimulant medication would work better for a particular person, therefore, in the absence of familial history, 'trial and error' is the current approach to medication treatment for ADHD.    Lack of response to an amphetamine stimulant does not rule out the possibility of response to a methylphenidate stimulant, and vice versa.    Studies in children with ADHD have not shown either family of stimulants to be more effective than the other, however, most studies of stimulant use in children with NF1 have involved treatment with methylphenidate stimulants.    We prefer to avoid the slightly increased cardiovascular risk associated with amphetamine stimulants because people with NF1 have a higher risk of cardiovascular health problems over the lifespan compared to the general population.    The medication we often suggest for school-age children with NF1/ADHD is methylphenidate CD 'Metadate CD' for several reasons:  1. Methlyphenidate stimulants have been well-studied in children with NF1 (as opposed to amphetamine stimulants, which are not often chosen for first-line treatment in patients with NF1).  2. In the methylphenidate stimulant family, 'Metadate CD' has a shorter duration of action (8-10 hours) compared to 'Concerta' (10-12 hours). A shorter duration is preferable for younger school-age children, who may not need extended coverage in the late afternoon/early evening. This slightly shorter duration also allows for earlier appetite recovery (kids eat more at dinner, reducing the risks associated with appetite suppression).   3. 'Metadate CD' has a stable level in the blood through the day, which gradually tapers off in the late afternoon/early evening, rather than the  'peaks and valleys' seen with other long-acting methylphenidate formulations. This maximizes the medication effect during the school day, and minimizes mood swings as the blood level falls later in the day.   4. If there is a concern for potential misuse of a child's methylphenidate prescription by  household members or close contacts, an alternative is a methylphenidate skin patch called Daytrana - this patch is available in different dose levels, and is placed on the child's skin each morning and removed in the late afternoon or early evening.    Additional resources for medication info:  ADHD Parents Medication Guide (2013): http://www.parentsmedguide.org/pmg_adhd.html  This guide covers nearly every question a parent might have about medication for ADHD (except the med chart doesn't tell you how many hours each medication lasts):     Medications Used in the Treatment of AD/HD (from [facility]): http://www.ifej8yotz.org/documents/Medchart.pdf  This chart includes how many hours each medication lasts.    Other Instructions:    Ophthalmology (eye MD) exam every year, or as recommended by your specialist    Annual physical with your Primary Care Provider    Influenza vaccine every year in the fall    Use Broad-Spectrum sunscreen SPF 15-30 from April through September    Call if you develop any of the following:    New or worsening headaches    Vision changes    Elevated blood pressure    Rapidly growing or painful lump    New or worsening pain, numbness, tingling or weakness    New or worsening heartburn, abdominal pain, or change in bowel movements    Any other new or concerning symptom you would like to discuss    ------------------------------------------------------------------------------------------------------------------------------    Neurofibromatosis (NF) Clinic  [facility], 9th Floor - [address] [address]  Fax: [phone]    Scheduling/Appointments: [phone]  Selma Francis - NF   Phone: [phone]   Services: [phone]   Infusion Center/Lab: [phone]   Radiology/[phone] (Pediatrics) / [phone] (Adults)  Pediatric Specialty Call Center: [phone]  Adult Specialty Call Center: [phone]     Concerns or questions for your care team:  Monday - Friday, 8:00 am - 5:00 pm:    Non-urgent concerns: Voicemail: [phone]    Urgent concerns: [facility]: [phone].  Nights and weekends:   Call [phone] and ask the  to page the 'Pediatric Hematology/Oncology fellow on call' if you have an urgent concern that can't wait until the clinic opens.    ------------------------------------------------------------------------------------------------------------------------------    Neurofibromatosis Team  Baldomero Hernandez MD - Director, Neurofibromatosis/Pediatric Neuro-Oncology  Marcelina López MD - Pediatric Genetics  Triston Clark MD - Pediatric Genetics  Daphne Egan, CNP, APRN - Pediatric Neuro-Oncology  Darya House MS, RN - NF Care Coordinator  Voicemail: [phone]  Pager: [phone]  E-mail: [email or web address]  Nina Jimenez RN - Tumor Care Coordinator     Voicemail: [phone]  BELLO Pop, SW -      Phone: [phone]  Sherice Arechiga CGC - Genetic Counselor  Phone: [phone]  Selma Francis -   Phone: [phone]

## 2019-09-04 NOTE — PROGRESS NOTES
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    Guy is concerned with recent forgetfulness. He will forget to grab things on his way out the door or put things down and forget where he put them. He will also forget to do things that have been in his daily routine for years. His mother reports that Guy's organization is very minimal. He tries to use a planner as well as the calendar on his phone to keep his schedule straight. They aren't sure if the forgetfulness is associated with the ADHD medication or not. The forgetfulness is causing lots of frustration at home with his family.    His neck pain and tingling in his fingers has decreased since we last saw him. Guy and his mother report that Guy has diminished feeling in his skin. He has burned his forearm cooking and didn't notice until he saw the burn. Instances like this have occurred a few times. He is really heavy handed with their new puppy when he doesn't mean to be. This last weekend, the family took out their carpet which they realized was on top of a really sticky substance. Guy's mother felt pain when peeling her feet off of the substance, but Guy was walking around on it with no problem.    The cataract surgery went really well. His vision is much better.    Guy is followed by Dr. Joaquin in dermatology.    He is currently taking Ritalin at 5 mg twice a day for ADHD. He occasionally forgets to take it. Before creating an alarm to remember the medication, Guy would forget all the time, but now remembers most times. This medication is managed by Dr. Sonny Lr.    Guy's mother also wants a new letter that states it would be best for Guy's treatment to remain on her insurance.     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 He is in his 3rd year at [employer]. Guy enjoys  running and riding his bike for physical activity. The family has recently gotten an 11 week old causey retriever in addition to their other dog.     History was obtained from Guy and his adoptive mother.    Allergies   Allergen Reactions     Bacitracin Blisters     Chloral Hydrate Other (See Comments)     Patient becomes aggressive and hallucinations     Current Outpatient Medications   Medication     albuterol (2.5 MG/3ML) 0.083% nebulizer solution     albuterol (PROAIR HFA, PROVENTIL HFA, VENTOLIN HFA) 108 (90 BASE) MCG/ACT inhaler     benzoyl peroxide 5 % LIQD     Cholecalciferol (VITAMIN D PO)     methylphenidate (RITALIN) 5 MG tablet     methylphenidate (RITALIN) 5 MG tablet     methylphenidate (RITALIN) 5 MG tablet     tretinoin (RETIN-A) 0.025 % cream     No current facility-administered medications for this visit.      Past Medical History:   Diagnosis Date     Asthma      Hearing loss of left ear      Neurofibromatosis (H)      Past Surgical History:   Procedure Laterality Date     BACK SURGERY       ear surgeries      3 tympanomastoidectomies     PHACOEMULSIFICATION CLEAR CORNEA WITH STANDARD INTRAOCULAR LENS IMPLANT Left 6/19/2019    Procedure: LEFT EYE CATARACT EXTRACTION WITH INTRAOCULAR LENS IMPLANT;  Surgeon: Triston Barr MD;  Location: [facility]     Family History   Problem Relation Age of Onset     Unknown/Adopted Mother      Unknown/Adopted Father      Unknown/Adopted Maternal Grandmother      Unknown/Adopted Maternal Grandfather      Review of Systems   Constitutional: Negative for appetite change.        Mom thinks his clothes are baggier and she is slightly concerned that he has lost weight.   HENT: Positive for hearing loss (Hearing is good with hearing aid in left ear. Right ear is at the bottom of the normal range for hearing. Due for an ENT visit.).         Left ear is blocked with cerumen. Itched so much that his ear bled, but this has since stopped.   Eyes: Negative for discharge.      "   Had cataract surgery that went well. Vision is much better since.   Genitourinary: Negative.    Skin:        He has some spots of lightening on the sides of his nose. He is planning on seeing a dermatologist for this.  Dark color of the toenails has remained stable.  Acne is well managed.   Neurological: Negative for headaches.        See HPI.   Psychiatric/Behavioral: Negative for sleep disturbance.   All other systems reviewed and are negative.    /80 (BP Location: Left arm, Patient Position: Chair, Cuff Size: Adult Regular)   Pulse 86   Temp 97.7  F (36.5  C) (Oral)   Resp 16   Ht 1.702 m (5' 7.01\")   Wt 62.7 kg (138 lb 3.7 oz)   SpO2 100%   BMI 21.64 kg/m      Physical Exam   Constitutional: He is oriented to person, place, and time. He appears well-developed.   HENT:   Scratch in left ear, no evidence of otitis.  Impacted cerumen bilaterally.   Neurological: He is alert and oriented to person, place, and time.   Skin: Skin is warm and dry.   Psychiatric: He has a normal mood and affect.     Impression:  1. NF1  2. Bilateral ankle valgus, improved with orthotics      Plan:  1. RTC in 1 year for follow up, or sooner PRN.  2. We will contact neuropsychology so you can complete repeat testing.  3. Follow up with dermatology.  4. I will write you a new letter for the insurance company.   5. Try Debrox for ear wax removal.    Time spent with patient 40 minutes.    This document serves as a record of the services and decisions personally performed and made by Baldomero Hernandez MD. It was created on his behalf by Sherice Contreras a trained medical scribe. The creation of this document is based on the provider's statements to the medical scribe.    The documentation recorded by the scribe accurately reflects the services I personally performed and the decisions made by me.    Baldomero Hernandez    CC  Patient Care Team:  Sonny Lr as PCP - General (Internal Medicine)  Lacy, " Susan ROCHA RN as Continuity Care Coordinator (Pediatric Hematology/Oncology)  Baldomero Hernandez MD as Referring Physician (Pediatric Hematology/Oncology)  Daphne Egan APRN CNP as Nurse Practitioner (Pediatric Hematology/Oncology)  Radha Hammond, PhD LP (Psychology)  Schwab, Briana, [place] as Nurse Coordinator  YESICA SPRINGER    Copy to patient  DEBBIE PATINO  [address]  [place] [address]    Thank you for choosing [facility]!   It was a pleasure to see you in our Neurofibromatosis Clinic today.  [email or web address]    Here's our recommendations for follow-up care:    Referrals/Tests/Plan:  Darya will look into options for formal ADHD evaluation and get back to you about this.  We will send you a letter for your mom's insurance company to specify that your medical specialty care for NF1 is an ongoing, lifetime need.  We will schedule you for a follow-up appointment with Dr. Munguia in dermatology.  We will send a letter to your PCP to suggest a trial of long-acting methylphenidate CD, with short-acting methylphenidate as needed for evening homework/studying  REturn in 6 months for follow-up.    Resources for NF1 and ADHD      These 2 links take you to a list of handouts/brochures about NF; including information for parents and teachers about educational challenges for kids with NF1:      The Children's Tumor Foundation is a non-profit 501(c)(3) medical foundation, dedicated to improving the health and well being of individuals and families affected by neurofibromatosis.  http://www.ctf.org/Patient-Information/Patient-Information-Brochures.html    The NF Network (founded in 1988 as Neurofibromatosis[employer] Inc.) serves as an umbrella to facilitate collaboration on national activities and a networking of shared resources.   https://nfnetwork.org/resources/educational-materials/    Here are 4 excellent  websites for ADHD info:  1. Children and Adults with Attention-Deficit/Hyperactivity Disorder ([place]): http://www.africa.org/  2. The National Resource Center on ADHD ([facility]): A Program of [place]. The national clearinghouse for the latest evidence-based information on ADHD. http://suvj7tmew.org/  3. KidsHealth: http://kidshealth.org/parent/dictionary/a/az-adhd.html  4. ADDVANCE; Answers to You Questions about AD/HD http://www.[employer].com/    ADHD Medication Info:    There are two 'families' of stimulant medications approved to treat ADHD symptoms: amphetamine stimulants and methylphenidate stimulants.    There is no test available to determine which family of stimulant medication would work better for a particular person, therefore, in the absence of familial history, 'trial and error' is the current approach to medication treatment for ADHD.    Lack of response to an amphetamine stimulant does not rule out the possibility of response to a methylphenidate stimulant, and vice versa.    Studies in children with ADHD have not shown either family of stimulants to be more effective than the other, however, most studies of stimulant use in children with NF1 have involved treatment with methylphenidate stimulants.    We prefer to avoid the slightly increased cardiovascular risk associated with amphetamine stimulants because people with NF1 have a higher risk of cardiovascular health problems over the lifespan compared to the general population.    The medication we often suggest for school-age children with NF1/ADHD is methylphenidate CD 'Metadate CD' for several reasons:  1. Methlyphenidate stimulants have been well-studied in children with NF1 (as opposed to amphetamine stimulants, which are not often chosen for first-line treatment in patients with NF1).  2. In the methylphenidate stimulant family, 'Metadate CD' has a shorter duration of action (8-10 hours) compared to 'Concerta' (10-12 hours). A shorter duration is  preferable for younger school-age children, who may not need extended coverage in the late afternoon/early evening. This slightly shorter duration also allows for earlier appetite recovery (kids eat more at dinner, reducing the risks associated with appetite suppression).   3. 'Metadate CD' has a stable level in the blood through the day, which gradually tapers off in the late afternoon/early evening, rather than the 'peaks and valleys' seen with other long-acting methylphenidate formulations. This maximizes the medication effect during the school day, and minimizes mood swings as the blood level falls later in the day.   4. If there is a concern for potential misuse of a child's methylphenidate prescription by  household members or close contacts, an alternative is a methylphenidate skin patch called Daytrana - this patch is available in different dose levels, and is placed on the child's skin each morning and removed in the late afternoon or early evening.    Additional resources for medication info:  ADHD Parents Medication Guide (2013): http://www.parentsmedguide.org/pmg_adhd.html  This guide covers nearly every question a parent might have about medication for ADHD (except the med chart doesn't tell you how many hours each medication lasts):     Medications Used in the Treatment of AD/HD (from [facility]): http://www.nvrl0akzp.org/documents/Medchart.pdf  This chart includes how many hours each medication lasts.    Other Instructions:    Ophthalmology (eye MD) exam every year, or as recommended by your specialist    Annual physical with your Primary Care Provider    Influenza vaccine every year in the fall    Use Broad-Spectrum sunscreen SPF 15-30 from April through September    Call if you develop any of the following:    New or worsening headaches    Vision changes    Elevated blood pressure    Rapidly growing or painful lump    New or worsening pain, numbness, tingling or weakness    New or worsening heartburn,  abdominal pain, or change in bowel movements    Any other new or concerning symptom you would like to discuss    ------------------------------------------------------------------------------------------------------------------------------    Neurofibromatosis (NF) Clinic  [facility], 9th Floor - [address] [address]  Fax: [phone]   Scheduling/Appointments: [phone]  Selma Gottlieb    Phone: [phone]   Services: [phone]   Infusion Center/Lab: [phone]   Radiology/[phone] (Pediatrics) / [phone] (Adults)  Pediatric Specialty Call Center: [phone]  Adult Specialty Call Center: [phone]     Concerns or questions for your care team:  Monday - Friday, 8:00 am - 5:00 pm:    Non-urgent concerns: Voicemail: [phone]    Urgent concerns: [facility]: [phone].  Nights and weekends:   Call [phone] and ask the  to page the 'Pediatric Hematology/Oncology fellow on call' if you have an urgent concern that can't wait until the clinic opens.    ------------------------------------------------------------------------------------------------------------------------------    Neurofibromatosis Team  Baldomero Hernandez MD - Director, Neurofibromatosis/Pediatric Neuro-Oncology  Marcelina López MD - Pediatric Genetics  Triston Clark MD - Pediatric Genetics  Daphne Egan, CNP, APRN - Pediatric Neuro-Oncology  Darya House MS, RN - NF Care Coordinator  Voicemail: [phone]  Pager: [phone]  E-mail: [email or web address]  Nina Jimenez RN - Tumor Care Coordinator     Voicemail: [phone]  BELLO Pop, [facility] -      Phone: [phone]  Sherice Arechiga [facility] [place] - Genetic Counselor  Phone: [phone]  Selma Francis -   Phone: [phone]

## 2019-09-04 NOTE — LETTER
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    Guy is concerned with recent forgetfulness. He will forget to grab things on his way out the door or put things down and forget where he put them. He will also forget to do things that have been in his daily routine for years. His mother reports that Genevieves organization is very minimal. He tries to use a planner as well as the calendar on his phone to keep his schedule straight. They aren't sure if the forgetfulness is associated with the ADHD medication or not. The forgetfulness is causing lots of frustration at home with his family.    His neck pain and tingling in his fingers has decreased since we last saw him. Guy and his mother report that Guy has diminished feeling in his skin. He has burned his forearm cooking and didn't notice until he saw the burn. Instances like this have occurred a few times. He is really heavy handed with their new puppy when he doesn't mean to be. This last weekend, the family took out their carpet which they realized was on top of a really sticky substance. Guy's mother felt pain when peeling her feet off of the substance, but Guy was walking around on it with no problem.    The cataract surgery went really well. His vision is much better.    Guy is followed by Dr. Joaquin in dermatology.    He is currently taking Ritalin at 5 mg twice a day for ADHD. He occasionally forgets to take it. Before creating an alarm to remember the medication, Guy would forget all the time, but now remembers most times. This medication is managed by Dr. Sonny Lr.    Guy's mother also wants a new letter that states it would be best for Guy's treatment to remain on her insurance.     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 He is in his 3rd year at [employer]. Guy enjoys running and riding his bike for physical activity. The family has recently gotten an 11 week old causey retriever in addition to their other dog.     History was obtained from Guy and his adoptive mother.    Allergies   Allergen Reactions     Bacitracin Blisters     Chloral Hydrate Other (See Comments)     Patient becomes aggressive and hallucinations     Current Outpatient Medications   Medication     albuterol (2.5 MG/3ML) 0.083% nebulizer solution     albuterol (PROAIR HFA, PROVENTIL HFA, VENTOLIN HFA) 108 (90 BASE) MCG/ACT inhaler     benzoyl peroxide 5 % LIQD     Cholecalciferol (VITAMIN D PO)     methylphenidate (RITALIN) 5 MG tablet     methylphenidate (RITALIN) 5 MG tablet     methylphenidate (RITALIN) 5 MG tablet     tretinoin (RETIN-A) 0.025 % cream     No current facility-administered medications for this visit.      Past Medical History:   Diagnosis Date     Asthma      Hearing loss of left ear      Neurofibromatosis (H)      Past Surgical History:   Procedure Laterality Date     BACK SURGERY       ear surgeries      3 tympanomastoidectomies     PHACOEMULSIFICATION CLEAR CORNEA WITH STANDARD INTRAOCULAR LENS IMPLANT Left 6/19/2019    Procedure: LEFT EYE CATARACT EXTRACTION WITH INTRAOCULAR LENS IMPLANT;  Surgeon: Triston Barr MD;  [facility]     Family History   Problem Relation Age of Onset     Unknown/Adopted Mother      Unknown/Adopted Father      Unknown/Adopted Maternal Grandmother      Unknown/Adopted Maternal Grandfather      Review of Systems   Constitutional: Negative for appetite change.        Mom thinks his clothes are baggier and she is slightly concerned that he has lost weight.   HENT: Positive for hearing loss (Hearing is good with hearing aid in left ear. Right ear is at the bottom of the normal range for hearing. Due for an ENT visit.).         Left ear is blocked with cerumen. Itched so  "much that his ear bled, but this has since stopped.   Eyes: Negative for discharge.        Had cataract surgery that went well. Vision is much better since.   Genitourinary: Negative.    Skin:        He has some spots of lightening on the sides of his nose. He is planning on seeing a dermatologist for this.  Dark color of the toenails has remained stable.  Acne is well managed.   Neurological: Negative for headaches.        See HPI.   Psychiatric/Behavioral: Negative for sleep disturbance.   All other systems reviewed and are negative.    /80 (BP Location: Left arm, Patient Position: Chair, Cuff Size: Adult Regular)   Pulse 86   Temp 97.7  F (36.5  C) (Oral)   Resp 16   Ht 1.702 m (5' 7.01\")   Wt 62.7 kg (138 lb 3.7 oz)   SpO2 100%   BMI 21.64 kg/m       Physical Exam   Constitutional: He is oriented to person, place, and time. He appears well-developed.   HENT:   Scratch in left ear, no evidence of otitis.  Impacted cerumen bilaterally.   Neurological: He is alert and oriented to person, place, and time.   Skin: Skin is warm and dry.   Psychiatric: He has a normal mood and affect.     Impression:  1. NF1  2. Bilateral ankle valgus, improved with orthotics      Plan:  1. RTC in 1 year for follow up, or sooner PRN.  2. We will contact neuropsychology so you can complete repeat testing.  3. Follow up with dermatology.  4. I will write you a new letter for the insurance company.   5. Try Debrox for ear wax removal.    Time spent with patient 40 minutes.    This document serves as a record of the services and decisions personally performed and made by Baldomero Hernandez MD. It was created on his behalf by Sherice hendricks trained medical scribe. The creation of this document is based on the provider's statements to the medical scribe.    The documentation recorded by the scribe accurately reflects the services I personally performed and the decisions made by me.    Baldomero Camp " David MONTOYA  Patient Care Team:  Sonny Lr as PCP - General (Internal Medicine)  Susan House, RN as Continuity Care Coordinator (Pediatric Hematology/Oncology)  Daphne Egan APRN CNP as Nurse Practitioner (Pediatric Hematology/Oncology)  Radha Hammond, PhD LP (Psychology)  Schwab, Briana, [place] as Nurse Coordinator    Copy to patient  DEBBIE PATINO  [address]  [place] [address]    Thank you for choosing [facility]!   It was a pleasure to see you in our Neurofibromatosis Clinic today.  [email or web address]    Here's our recommendations for follow-up care:    Referrals/Tests/Plan:  Darya will look into options for formal ADHD evaluation and get back to you about this.  We will send you a letter for your mom's insurance company to specify that your medical specialty care for NF1 is an ongoing, lifetime need.  We will schedule you for a follow-up appointment with Dr. Munguia in dermatology.  We will send a letter to your PCP to suggest a trial of long-acting methylphenidate CD, with short-acting methylphenidate as needed for evening homework/studying  REturn in 6 months for follow-up.    Resources for NF1 and ADHD      These 2 links take you to a list of handouts/brochures about NF; including information for parents and teachers about educational challenges for kids with NF1:      The Children's Tumor Foundation is a non-profit 501(c)(3) medical foundation, dedicated to improving the health and well being of individuals and families affected by neurofibromatosis.  http://www.ctf.org/Patient-Information/Patient-Information-Brochures.html    The NF Network (founded in 1988 as Neurofibromatosis, Inc.) serves as an umbrella to facilitate collaboration on national activities and a networking of shared resources.   https://nfnetwork.org/resources/educational-materials/    Here are 4 excellent websites  for ADHD info:  1. Children and Adults with Attention-Deficit/Hyperactivity Disorder ([place]): http://www.africa.org/  2. The National Resource Center on ADHD ([facility]): A Program of [place]. The national clearinghouse for the latest evidence-based information on ADHD. http://anpn0lebb.org/  3. KidsHealth: http://kidshealth.org/parent/dictionary/a/az-adhd.html  4. ADDVANCE; Answers to You Questions about AD/HD http://www.tagga.com/    ADHD Medication Info:    There are two 'families' of stimulant medications approved to treat ADHD symptoms: amphetamine stimulants and methylphenidate stimulants.    There is no test available to determine which family of stimulant medication would work better for a particular person, therefore, in the absence of familial history, 'trial and error' is the current approach to medication treatment for ADHD.    Lack of response to an amphetamine stimulant does not rule out the possibility of response to a methylphenidate stimulant, and vice versa.    Studies in children with ADHD have not shown either family of stimulants to be more effective than the other, however, most studies of stimulant use in children with NF1 have involved treatment with methylphenidate stimulants.    We prefer to avoid the slightly increased cardiovascular risk associated with amphetamine stimulants because people with NF1 have a higher risk of cardiovascular health problems over the lifespan compared to the general population.    The medication we often suggest for school-age children with NF1/ADHD is methylphenidate CD 'Metadate CD' for several reasons:  1. Methlyphenidate stimulants have been well-studied in children with NF1 (as opposed to amphetamine stimulants, which are not often chosen for first-line treatment in patients with NF1).  2. In the methylphenidate stimulant family, 'Metadate CD' has a shorter duration of action (8-10 hours) compared to 'Concerta' (10-12 hours). A shorter duration is preferable for  younger school-age children, who may not need extended coverage in the late afternoon/early evening. This slightly shorter duration also allows for earlier appetite recovery (kids eat more at dinner, reducing the risks associated with appetite suppression).   3. 'Metadate CD' has a stable level in the blood through the day, which gradually tapers off in the late afternoon/early evening, rather than the 'peaks and valleys' seen with other long-acting methylphenidate formulations. This maximizes the medication effect during the school day, and minimizes mood swings as the blood level falls later in the day.   4. If there is a concern for potential misuse of a child's methylphenidate prescription by  household members or close contacts, an alternative is a methylphenidate skin patch called Daytrana - this patch is available in different dose levels, and is placed on the child's skin each morning and removed in the late afternoon or early evening.    Additional resources for medication info:  ADHD Parents Medication Guide (2013): http://www.parentsmedguide.org/pmg_adhd.html  This guide covers nearly every question a parent might have about medication for ADHD (except the med chart doesn't tell you how many hours each medication lasts):     Medications Used in the Treatment of AD/HD (from [facility] http://www.cnfq4ykbr.org/documents/Medchart.pdf  This chart includes how many hours each medication lasts.    Other Instructions:    Ophthalmology (eye MD) exam every year, or as recommended by your specialist    Annual physical with your Primary Care Provider    Influenza vaccine every year in the fall    Use Broad-Spectrum sunscreen SPF 15-30 from April through September    Call if you develop any of the following:    New or worsening headaches    Vision changes    Elevated blood pressure    Rapidly growing or painful lump    New or worsening pain, numbness, tingling or weakness    New or worsening heartburn, abdominal pain, or  change in bowel movements    Any other new or concerning symptom you would like to discuss    ------------------------------------------------------------------------------------------------------------------------------    Neurofibromatosis (NF) Clinic  [facility], 9th Floor - [address] [address]  Fax: [phone]   Scheduling/Appointments: [phone]  Selma Gottlieb    Phone: [phone]   Services: [phone]   Infusion Center/Lab: [phone]   Radiology/[phone] (Pediatrics) / [phone] (Adults)  Pediatric Specialty Call Center: [phone]  Adult Specialty Call Center: [phone]     Concerns or questions for your care team:  Monday - Friday, 8:00 am - 5:00 pm:    Non-urgent concerns: Voicemail: [phone]    Urgent concerns: [facility]: [phone].  Nights and weekends:   Call [phone] and ask the  to page the 'Pediatric Hematology/Oncology fellow on call' if you have an urgent concern that can't wait until the clinic opens.    ------------------------------------------------------------------------------------------------------------------------------    Neurofibromatosis Team  Baldomero Hernandez MD - Director, Neurofibromatosis/Pediatric Neuro-Oncology  Marcelina López MD - Pediatric Genetics  Triston Clark MD - Pediatric Genetics  Daphne Egan, CNP, APRN - Pediatric Neuro-Oncology  Darya House MS, RN - NF Care Coordinator  Voicemail: [phone]  Pager: [phone]  E-mail: [email or web address]  Nina Jimenez, RN - Tumor Care Coordinator     Voicemail: [phone]  BELLO Pop, [facility] -      Phone: [phone]  Sherice Arechiga [facility] - Genetic Counselor  Phone: [phone]  Selma Francis -   Phone: [phone]    Baldomero Hernandez MD

## 2019-09-04 NOTE — NURSING NOTE
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   September 4, 2019    "

## 2019-10-11 ENCOUNTER — OFFICE VISIT (OUTPATIENT)
Dept: DERMATOLOGY | Facility: CLINIC | Age: 29
End: 2019-10-11
Attending: DERMATOLOGY
Payer: COMMERCIAL

## 2019-10-11 VITALS — BODY MASS INDEX: 22.46 KG/M2 | WEIGHT: 143.08 LBS | HEIGHT: 67 IN

## 2019-10-11 DIAGNOSIS — L60.8 MELANONYCHIA: Primary | ICD-10-CM

## 2019-10-11 PROCEDURE — G0463 HOSPITAL OUTPT CLINIC VISIT: HCPCS | Mod: ZF

## 2019-10-11 RX ORDER — METHYLPHENIDATE HYDROCHLORIDE 10 MG/1
10 CAPSULE, EXTENDED RELEASE ORAL
COMMUNITY
Start: 2019-09-25

## 2019-10-11 ASSESSMENT — MIFFLIN-ST. JEOR: SCORE: 1579.01

## 2019-10-11 ASSESSMENT — PAIN SCALES - GENERAL: PAINLEVEL: NO PAIN (0)

## 2019-10-11 NOTE — PATIENT INSTRUCTIONS
[facility]- Pediatric Dermatology  Dr. Albania Aguiar, Dr. Hetal Joaquin, Dr. Mariah Larios, Melina Fernandez, JJ Cerda, Dr. Nano Paulino & Dr. Velasquez Ball     Acne is looking great! You can use spot treatments as needed.    Toenails do not look significantly changed at this time but we will want to continue monitoring yearly. If you notice any changes in the nail plate (nail lifting) or pigmentation in proximal nail fold then contact us sooner.    Try to use a milder soap like dove for daily washing and moisturize daily to improve dry skin.         Non Urgent  Nurse Triage Line; [phone]- Nu and Aditi RN Care Coordinators      [facility] Pediatric Dermatology Specialty - [phone]      If you need a prescription refill, please contact your pharmacy. Refills are approved or denied by our Physicians during normal business hours, Monday through Fridays    Per office policy, refills will not be granted if you have not been seen within the past year (or sooner depending on your child's condition)      Scheduling Information:     Pediatric Appointment Scheduling and Call Center [phone]   Radiology Scheduling- [phone]     Sedation Unit Scheduling- [phone]    [place] Scheduling- [facility] [phone]; Pediatric Dermatology [phone]    Main  Services: [phone]   British: [phone]   Dutch: [phone]   Hmong/Czech/Frisian: [phone]      Preadmission Nursing Department Fax Number: [phone] (Fax all pre-operative paperwork to this number)      For urgent matters arising during evenings, weekends, or holidays that cannot wait for normal business hours please call [phone] and ask for the Dermatology Resident On-Call to be paged.

## 2019-10-11 NOTE — LETTER
10/11/2019      RE: Guy Thornton  [address]  [place] [address]       Pediatric Dermatology Follow Up     Referring Physician: Sonny Lr   CC: Evaluate for neurofibromas and melanonychia    HPI:   We had the pleasure of seeing Guy in our Pediatric Dermatology clinic today, in consultation from Sonny Lr for evaluation of neurofibromas and melanonychia. He is here with his mom.     Guy is a 28 yo with neurofibromatosis type I seen for follow up. He and his mom are unsure if the size of melanonychia on his toes has grown or if new have appeared. They have not noticed any rapid growth or change in the the structure of these toe nails. They also are concerned about a nodule on the left side of Yevgeniy's nose. They feel that this region is lighter in color and changes in size periodically. Yevgeniy also reports tingling in the tips of the third and fourth finger of both hand whcih has occurred only once in the last six months. He did received cataract surgery in June and currently has progressive lenses. Otherwise, Yevgeniy is doing well today.      Past Medical/Surgical History:   1) Neurofibromatosis 1  2) History of injury to left flank (danced in shower and cut himself on soap tray)    Family History: Unknown as adopted  Social History: Adopted. Lives at home with mother and two dogs.     Medications:   Current Outpatient Medications   Medication Sig Dispense Refill     albuterol (2.5 MG/3ML) 0.083% nebulizer solution Take 1 vial (2.5 mg) by nebulization every 6 hours as needed for shortness of breath / dyspnea or wheezing 75 mL 0     albuterol (PROAIR HFA, PROVENTIL HFA, VENTOLIN HFA) 108 (90 BASE) MCG/ACT inhaler Inhale 2 puffs into the lungs every 4 hours as needed 1 Inhaler 2     benzoyl peroxide 5 % LIQD Use every morning as wash to face 226 g 3     Cholecalciferol (VITAMIN D PO) Unit amount unknown, takes one tablet PO Q day when remembers       methylphenidate (METADATE CD) 10 MG  "CR capsule Take 10 mg by mouth       methylphenidate (RITALIN) 5 MG tablet Take 2 tablets (10 mg) by mouth 2 times daily In the AM and at lunchtime. (Patient not taking: Reported on 10/11/2019) 60 tablet 0     methylphenidate (RITALIN) 5 MG tablet Take 1 tablet (5 mg) by mouth 2 times daily in the AM and at lunchtime. (Patient not taking: Reported on 10/11/2019) 60 tablet 0     methylphenidate (RITALIN) 5 MG tablet Take 1 tablet (5 mg) by mouth 2 times daily in the AM and at lunchtime. (Patient not taking: Reported on 10/11/2019) 60 tablet 0     tretinoin (RETIN-A) 0.025 % cream Use every night (Patient not taking: Reported on 10/11/2019) 45 g 3      Allergies:   Allergies   Allergen Reactions     Bacitracin Blisters     Chloral Hydrate Other (See Comments)     Patient becomes aggressive and hallucinations      ROS:  Yevgeniy denies fevers, chills, cough, rhinorrhea, chest pain, shortness of breath, abdominal pain, nausea, vomiting, diarrhea, constipation, genitourinary, or musculoskeletal complaints. He does note the tingling stated in HPI and reduced hearing in the right ear due to impaction.   Physical examination: Ht 5' 6.77\" (169.6 cm)   Wt 64.9 kg (143 lb 1.3 oz)   BMI 22.56 kg/m      General: Well-developed, well-nourished in no apparent distress.   HEENT: Eyelids and conjunctivae normal.  PULM: Patient was breathing comfortably on room air.   CV: Extremities were warm and well-perfused. There was no clubbing or cyanosis, nails normal.    GI: No abdominal organomegaly.   PSYCH: Normal mood and affect.    Skin: A complete skin examination and palpation of skin and subcutaneous tissues of the face, chest, back, abdomen, and upper and lower extremities was performed and was normal except as noted below:  - On right shoulder, right lower back, left shoulder are skin colored to brown, fleshy 2-3mm superficial papules  - on left nasal side wall there is benign skin thickening   - Many uniform dark brown patches on " the upper extremities and trunk  - Axillary freckling bilaterally  - Right third toe with a 9mm band of uniform melanonychia at lateral nail  - Right fifth toe with a 5mm band of uniform melanonychia at lateral nail  - Right fourth toe with a 1.5mm band of uniform melanonychia at middle nail and 1mm band at lateral nail  - Left third toe with a 2mm band of uniform melanonychia at lateral nail.  - Left 5th toe with discoloration.   - New tattoo left thigh healing well                Assessment/Plan:  Neurofibromatosis Type 1  Multiple benign melanocytic nevi, including several toenails  Melanonychia in several toenails  On clinical exam today, patient does not have any evidence cutaneous neurofibromas. The papules that we can see are more consistent with compound melanocytic nevi. He does have >6 cafe au lait macules and axillary freckling consistent with his diagnosis of NF1. The broader melanonychia on his bilateral 3rd toes are consistent with benign melanocytic nevi arising within the nail matrix. These have benign features today. He does have some melanonychia on his fingernails and other toe nails that are more typical of ethnic variation. We discussed to continue to watch for changes including rapid growth, nail dystrophy, pigment within the proximal nailfold and to return to clinic if any of this occurs.   Acne vulgaris  Largely cleared. Can use the following for spot treatment.  - BPO 5% wash qAM  - Tretinoin 0.025% cream qhs (discussed dryness, irritation, photosensitivity)     Follow up 1 year    Thank you for allowing us to participate in Guy's care.    Patient seen and discussed with Dr. Emani Nelson  M3    I was present with the medical student who participated in the service and in the documentation of the note.  I have verified the history and personally performed the physical exam and medical decision making.  I agree with the assessment and plan of care as documented in the note.    Hetal Joaquin MD

## 2019-10-11 NOTE — NURSING NOTE
"[id number]  Chief Complaint   Patient presents with     RECHECK     pt here in derm clinic for f/u acne     Initial Ht 5' 6.77\" (169.6 cm)   Wt 143 lb 1.3 oz (64.9 kg)   BMI 22.56 kg/m   Estimated body mass index is 22.56 kg/m  as calculated from the following:    Height as of this encounter: 5' 6.77\" (169.6 cm).    Weight as of this encounter: 143 lb 1.3 oz (64.9 kg).  Medication Reconciliation: complete   Risa Bentley LPN      "

## 2019-10-14 NOTE — PROGRESS NOTES
Pediatric Dermatology Follow Up     Referring Physician: Sonny Lr   CC: Evaluate for neurofibromas and melanonychia    HPI:   We had the pleasure of seeing Guy in our Pediatric Dermatology clinic today, in consultation from Sonny Lr for evaluation of neurofibromas and melanonychia. He is here with his mom.     Guy is a 30 yo with neurofibromatosis type I seen for follow up. He and his mom are unsure if the size of melanonychia on his toes has grown or if new have appeared. They have not noticed any rapid growth or change in the the structure of these toe nails. They also are concerned about a nodule on the left side of Yevgeniy's nose. They feel that this region is lighter in color and changes in size periodically. Yevgeniy also reports tingling in the tips of the third and fourth finger of both hand whcih has occurred only once in the last six months. He did received cataract surgery in June and currently has progressive lenses. Otherwise, Yevgeniy is doing well today.      Past Medical/Surgical History:   1) Neurofibromatosis 1  2) History of injury to left flank (danced in shower and cut himself on soap tray)    Family History: Unknown as adopted  Social History: Adopted. Lives at home with mother and two dogs.     Medications:   Current Outpatient Medications   Medication Sig Dispense Refill     albuterol (2.5 MG/3ML) 0.083% nebulizer solution Take 1 vial (2.5 mg) by nebulization every 6 hours as needed for shortness of breath / dyspnea or wheezing 75 mL 0     albuterol (PROAIR HFA, PROVENTIL HFA, VENTOLIN HFA) 108 (90 BASE) MCG/ACT inhaler Inhale 2 puffs into the lungs every 4 hours as needed 1 Inhaler 2     benzoyl peroxide 5 % LIQD Use every morning as wash to face 226 g 3     Cholecalciferol (VITAMIN D PO) Unit amount unknown, takes one tablet PO Q day when remembers       methylphenidate (METADATE CD) 10 MG CR capsule Take 10 mg by mouth       methylphenidate (RITALIN) 5 MG tablet Take 2  "tablets (10 mg) by mouth 2 times daily In the AM and at lunchtime. (Patient not taking: Reported on 10/11/2019) 60 tablet 0     methylphenidate (RITALIN) 5 MG tablet Take 1 tablet (5 mg) by mouth 2 times daily in the AM and at lunchtime. (Patient not taking: Reported on 10/11/2019) 60 tablet 0     methylphenidate (RITALIN) 5 MG tablet Take 1 tablet (5 mg) by mouth 2 times daily in the AM and at lunchtime. (Patient not taking: Reported on 10/11/2019) 60 tablet 0     tretinoin (RETIN-A) 0.025 % cream Use every night (Patient not taking: Reported on 10/11/2019) 45 g 3      Allergies:   Allergies   Allergen Reactions     Bacitracin Blisters     Chloral Hydrate Other (See Comments)     Patient becomes aggressive and hallucinations      ROS:  Yevgeniy denies fevers, chills, cough, rhinorrhea, chest pain, shortness of breath, abdominal pain, nausea, vomiting, diarrhea, constipation, genitourinary, or musculoskeletal complaints. He does note the tingling stated in HPI and reduced hearing in the right ear due to impaction.   Physical examination: Ht 5' 6.77\" (169.6 cm)   Wt 64.9 kg (143 lb 1.3 oz)   BMI 22.56 kg/m     General: Well-developed, well-nourished in no apparent distress.   HEENT: Eyelids and conjunctivae normal.  PULM: Patient was breathing comfortably on room air.   CV: Extremities were warm and well-perfused. There was no clubbing or cyanosis, nails normal.    GI: No abdominal organomegaly.   PSYCH: Normal mood and affect.    Skin: A complete skin examination and palpation of skin and subcutaneous tissues of the face, chest, back, abdomen, and upper and lower extremities was performed and was normal except as noted below:  - On right shoulder, right lower back, left shoulder are skin colored to brown, fleshy 2-3mm superficial papules  - on left nasal side wall there is benign skin thickening   - Many uniform dark brown patches on the upper extremities and trunk  - Axillary freckling bilaterally  - Right third toe " with a 9mm band of uniform melanonychia at lateral nail  - Right fifth toe with a 5mm band of uniform melanonychia at lateral nail  - Right fourth toe with a 1.5mm band of uniform melanonychia at middle nail and 1mm band at lateral nail  - Left third toe with a 2mm band of uniform melanonychia at lateral nail.  - Left 5th toe with discoloration.   - New tattoo left thigh healing well                Assessment/Plan:  Neurofibromatosis Type 1  Multiple benign melanocytic nevi, including several toenails  Melanonychia in several toenails  On clinical exam today, patient does not have any evidence cutaneous neurofibromas. The papules that we can see are more consistent with compound melanocytic nevi. He does have >6 cafe au lait macules and axillary freckling consistent with his diagnosis of NF1. The broader melanonychia on his bilateral 3rd toes are consistent with benign melanocytic nevi arising within the nail matrix. These have benign features today. He does have some melanonychia on his fingernails and other toe nails that are more typical of ethnic variation. We discussed to continue to watch for changes including rapid growth, nail dystrophy, pigment within the proximal nailfold and to return to clinic if any of this occurs.   Acne vulgaris  Largely cleared. Can use the following for spot treatment.  - BPO 5% wash qAM  - Tretinoin 0.025% cream qhs (discussed dryness, irritation, photosensitivity)     Follow up 1 year    Thank you for allowing us to participate in Guy's care.    Patient seen and discussed with Dr. Emani Nelson  M3    I was present with the medical student who participated in the service and in the documentation of the note.  I have verified the history and personally performed the physical exam and medical decision making.  I agree with the assessment and plan of care as documented in the note.   Hetal Joaquin MD

## 2020-02-04 ENCOUNTER — OFFICE VISIT (OUTPATIENT)
Dept: PEDIATRIC HEMATOLOGY/ONCOLOGY | Facility: CLINIC | Age: 30
End: 2020-02-04
Attending: PEDIATRICS
Payer: COMMERCIAL

## 2020-02-04 VITALS
SYSTOLIC BLOOD PRESSURE: 122 MMHG | WEIGHT: 141.98 LBS | DIASTOLIC BLOOD PRESSURE: 56 MMHG | HEART RATE: 100 BPM | HEIGHT: 67 IN | TEMPERATURE: 97.6 F | BODY MASS INDEX: 22.28 KG/M2 | RESPIRATION RATE: 20 BRPM | OXYGEN SATURATION: 100 %

## 2020-02-04 DIAGNOSIS — F89 DEVELOPMENTAL DISORDER: ICD-10-CM

## 2020-02-04 DIAGNOSIS — Q85.01 NEUROFIBROMATOSIS, PERIPHERAL, NF1 (H): Primary | ICD-10-CM

## 2020-02-04 DIAGNOSIS — F90.9 ATTENTION DEFICIT HYPERACTIVITY DISORDER (ADHD), UNSPECIFIED ADHD TYPE: ICD-10-CM

## 2020-02-04 PROCEDURE — G0463 HOSPITAL OUTPT CLINIC VISIT: HCPCS | Mod: ZF

## 2020-02-04 ASSESSMENT — ENCOUNTER SYMPTOMS
ALLERGIC/IMMUNOLOGIC NEGATIVE: 1
GASTROINTESTINAL NEGATIVE: 1
CONSTITUTIONAL NEGATIVE: 1
EYES NEGATIVE: 1
ENDOCRINE NEGATIVE: 1
CARDIOVASCULAR NEGATIVE: 1
RESPIRATORY NEGATIVE: 1
NEUROLOGICAL NEGATIVE: 1
MUSCULOSKELETAL NEGATIVE: 1

## 2020-02-04 ASSESSMENT — PAIN SCALES - GENERAL: PAINLEVEL: NO PAIN (0)

## 2020-02-04 ASSESSMENT — MIFFLIN-ST. JEOR: SCORE: 1579.01

## 2020-02-04 NOTE — LETTER
2/4/2020      RE: Guy Thornton  [address]  [place] [address]          Pediatric Hematology/Oncology Clinic Note     HPI-  Guy Thornton is a 29 year old male with NF1 who presents to the clinic with his adoptive mother for a follow up.    Since last visit, Guy reported to have been 'fired' from his job at his [employer] after working there for 3 months just yesterday due to taking breaks too often due to school. Does not feel discouraged but rather happy. Plans on getting a new job. 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      Currently, Guy has been doing well. Denies any recent headaches, finger tingling, or any other problems. Hearing aid works well when he wears it, but sometimes he takes them off in daily life. Also reports that his vision is good with no new problems, wears glasses.     Denies N/V/D/C symptoms. Stated having back pain from shoveling snow but has since resolved. Still itches his skin automatically and not from itching. Mother reports that he has scratch marks on his back . Doesn't itch while sleeping however. Reports his blood pressure is high where we discussed its significance with NF1 .     His mother reports that Guy had recently had problems with managing his checkbook and finances. He had spend 200$ on Uber fares. Mother is considering to get Guy to get a driving license.     Weight is maintained. States to be still hungry. Diet is not closely controlled but states to have limit eating junk food. States to eat more fruits than vegetables. However his mother reports that he sometimes has a phase of eating a lot of salad in a week.     Currently is taking 10 mg Metadate to treat his ADHD. Mother reports that Guy had some changes of his behavior from taking the medicine but is not too concerned. She is concerned however about his current behavior.     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  Guy enjoys running and riding his bike for physical activity. The family has recently gotten an 11 week old causey retriever in addition to their other dog. 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    History was obtained from Guy and adoptive mother.       Allergies   Allergen Reactions     Bacitracin Blisters     Chloral Hydrate Other (See Comments)     Patient becomes aggressive and hallucinations       Current Outpatient Medications   Medication     albuterol (2.5 MG/3ML) 0.083% nebulizer solution     albuterol (PROAIR HFA, PROVENTIL HFA, VENTOLIN HFA) 108 (90 BASE) MCG/ACT inhaler     benzoyl peroxide 5 % LIQD     Cholecalciferol (VITAMIN D PO)     methylphenidate (METADATE CD) 10 MG CR capsule     methylphenidate (RITALIN) 5 MG tablet     methylphenidate (RITALIN) 5 MG tablet     methylphenidate (RITALIN) 5 MG tablet     tretinoin (RETIN-A) 0.025 % cream     No current facility-administered medications for this visit.        Past Medical History:   Diagnosis Date     Asthma      Hearing loss of left ear      Neurofibromatosis (H)        Past Surgical History:   Procedure Laterality Date     BACK SURGERY       ear surgeries      3 tympanomastoidectomies     PHACOEMULSIFICATION CLEAR CORNEA WITH STANDARD INTRAOCULAR LENS IMPLANT Left 6/19/2019    Procedure: LEFT EYE CATARACT EXTRACTION WITH INTRAOCULAR LENS IMPLANT;  Surgeon: Triston Barr MD;  Location: [facility]       Family History   Problem Relation Age of Onset     Unknown/Adopted Mother      Unknown/Adopted Father      Unknown/Adopted Maternal Grandmother      Unknown/Adopted Maternal Grandfather        Review of Systems   Constitutional: Negative.    HENT: Negative.    Eyes: Negative.    Respiratory: Negative.    Cardiovascular: Negative.    Gastrointestinal: Negative.    Endocrine: Negative.    Genitourinary: Negative.    Musculoskeletal: Negative.    Allergic/Immunologic: Negative.    Neurological: Negative.        /56 (BP Location: Right  "arm, Patient Position: Sitting, Cuff Size: Adult Regular)   Pulse 100   Temp 97.6  F (36.4  C) (Oral)   Resp 20   Ht 1.704 m (5' 7.09\")   Wt 64.4 kg (141 lb 15.6 oz)   SpO2 100%   BMI 22.18 kg/m       Physical Exam  Constitutional:       Appearance: Normal appearance. He is well-developed.   HENT:      Head: Normocephalic and atraumatic.      Right Ear: External ear normal.      Left Ear: External ear normal.      Ears:      Comments: Has a stud on his right ear. A hearing aid on his left.     Nose: Nose normal.      Mouth/Throat:      Mouth: Mucous membranes are moist.      Pharynx: Oropharynx is clear.   Eyes:      Extraocular Movements: Extraocular movements intact.      Pupils: Pupils are equal, round, and reactive to light.      Comments: Wears glasses   Neck:      Musculoskeletal: Normal range of motion.   Cardiovascular:      Rate and Rhythm: Normal rate.   Pulmonary:      Effort: Pulmonary effort is normal.   Abdominal:      General: Abdomen is flat.   Skin:     General: Skin is warm and dry.      Comments: No changes  Cafe au lait macules   Neurological:      General: No focal deficit present.      Mental Status: He is alert and oriented to person, place, and time.   Psychiatric:         Mood and Affect: Mood and affect normal.           Impression:  1. NF1  2. Bilateral ankle valgus, improved with orthotics  3. Behavior issues - some impulsive financial issues    Plan:  1. RTC in 1 year for follow up, or sooner PRN.  2. Consult primary MD re: hypertension and stimulant medication.    Time spent with patient 40 minutes.    This document serves as a record of the services and decisions personally performed and made by Baldomero Hernandez MD. It was created on his behalf by Omar hendricks trained medical scribe. The creation of this document is based on the provider's statements to the medical scribe.    The documentation recorded by the scribe accurately reflects the services I personally " performed and the decisions made by me.    Baldomero Hernandez    CC  Patient Care Team:  Yesica Springer as PCP - General (Internal Medicine)  Susan House, RN as Continuity Care Coordinator (Pediatric Hematology/Oncology)  Baldomero Hernandez MD as Referring Physician (Pediatric Hematology/Oncology)  Daphne Egan APRN CNP as Nurse Practitioner (Pediatric Hematology/Oncology)  Radha Hammond, PhD LP (Psychology)  Schwab, Briana, [place] as Nurse Coordinator  YESICA SPRINGER    Copy to patient  DEBBIE PATINO  [address] [address]      Baldomero Hernandez MD

## 2020-02-04 NOTE — PATIENT INSTRUCTIONS
Thank you for choosing [facility]!   It was a pleasure to see you in our Neurofibromatosis Clinic today.  [email or web address]    Here's our recommendations for follow-up care:    Referrals/Tests/Plan:    ADHD evaluation at [facility] - please call to schedule appointment (see referral).    Other Instructions:    Ophthalmology (eye MD) exam every 6 months until age 6, then every year, or as recommended by your specialist    Regular follow-up with your Primary Care Provider    Influenza vaccine every year in the fall    Use Broad-Spectrum sunscreen SPF 15-30 from April through September    Call if you develop any of the following:    New or worsening headaches    Vision changes    Elevated blood pressure    Rapidly growing or painful lump    New or worsening pain, numbness, tingling or weakness    New or worsening heartburn, abdominal pain, or change in bowel movements    Any other new or concerning symptom you would like to discuss    ------------------------------------------------------------------------------------------------------------------------------    Neurofibromatosis (NF) Clinic  [facility], 9th Floor - [address] [address]  Fax: [phone]   Scheduling/Appointments: [phone]  Selma MCCAULEY   Phone: [phone]   Services: [phone]   Infusion Center/Lab: [phone]   Radiology/[phone] (Pediatrics) / [phone] (Adults)  Pediatric Specialty Call Center: [phone]  Adult Specialty Call Center: [phone]   Concerns or questions for your care team:  Monday - Friday, 8:00 am - 5:00 pm:    Non-urgent concerns: Voicemail: [phone]    Urgent concerns: [facility]: [phone].  Nights and weekends:   Call [phone] and ask the  to page the 'Pediatric  Hematology/Oncology fellow on call' if you have an urgent concern that can't wait until the clinic opens.    ------------------------------------------------------------------------------------------------------------------------------    Neurofibromatosis Team  Baldomero Hernandez MD - Director, Neurofibromatosis/Pediatric Neuro-Oncology  Carmel Clifton MD - Pediatric Genetics  Daphne Egan, CNP, APRN - Pediatric Neuro-Oncology  Darya House MS, RN - NF Care Coordinator  Voicemail: [phone]  Pager: [phone]  E-mail: [email or web address]  Nina Jimenez RN - Tumor Care Coordinator     Voicemail: [phone]  BELLO Pop, SW -      Phone: [phone]  Sherice Arechiga CGC - Genetic Counselor  Phone: [phone]

## 2020-02-04 NOTE — LETTER
2/4/2020      RE: Guy Thornton  [address]  [place] [address]          Pediatric Hematology/Oncology Clinic Note     HPI-  Guy Thornton is a 29 year old male with NF1 who presents to the clinic with his adoptive mother for a follow up.    Since last visit, Guy reported to have been 'fired' from his job at his [employer] after working there for 3 months just yesterday due to taking breaks too often due to school. Does not feel discouraged but rather happy. Plans on getting a new job. 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      Currently, Guy has been doing well. Denies any recent headaches, finger tingling, or any other problems. Hearing aid works well when he wears it, but sometimes he takes them off in daily life. Also reports that his vision is good with no new problems, wears glasses.     Denies N/V/D/C symptoms. Stated having back pain from shoveling snow but has since resolved. Still itches his skin automatically and not from itching. Mother reports that he has scratch marks on his back . Doesn't itch while sleeping however. Reports his blood pressure is high where we discussed its significance with NF1 .     His mother reports that Guy had recently had problems with managing his checkbook and finances. He had spend 200$ on Uber fares. Mother is considering to get Guy to get a driving license.     Weight is maintained. States to be still hungry. Diet is not closely controlled but states to have limit eating junk food. States to eat more fruits than vegetables. However his mother reports that he sometimes has a phase of eating a lot of salad in a week.     Currently is taking 10 mg Metadate to treat his ADHD. Mother reports that Guy had some changes of his behavior from taking the medicine but is not too concerned. She is concerned however about his current behavior.     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  Guy enjoys running and riding his bike for physical activity. The family has recently gotten an 11 week old causey retriever in addition to their other dog. He is also is studying 2nd year in Azeri.    History was obtained from Guy and adoptive mother.       Allergies   Allergen Reactions     Bacitracin Blisters     Chloral Hydrate Other (See Comments)     Patient becomes aggressive and hallucinations       Current Outpatient Medications   Medication     albuterol (2.5 MG/3ML) 0.083% nebulizer solution     albuterol (PROAIR HFA, PROVENTIL HFA, VENTOLIN HFA) 108 (90 BASE) MCG/ACT inhaler     benzoyl peroxide 5 % LIQD     Cholecalciferol (VITAMIN D PO)     methylphenidate (METADATE CD) 10 MG CR capsule     methylphenidate (RITALIN) 5 MG tablet     methylphenidate (RITALIN) 5 MG tablet     methylphenidate (RITALIN) 5 MG tablet     tretinoin (RETIN-A) 0.025 % cream     No current facility-administered medications for this visit.        Past Medical History:   Diagnosis Date     Asthma      Hearing loss of left ear      Neurofibromatosis (H)        Past Surgical History:   Procedure Laterality Date     BACK SURGERY       ear surgeries      3 tympanomastoidectomies     PHACOEMULSIFICATION CLEAR CORNEA WITH STANDARD INTRAOCULAR LENS IMPLANT Left 6/19/2019    Procedure: LEFT EYE CATARACT EXTRACTION WITH INTRAOCULAR LENS IMPLANT;  Surgeon: Triston Barr MD;  Location: [facility]       Family History   Problem Relation Age of Onset     Unknown/Adopted Mother      Unknown/Adopted Father      Unknown/Adopted Maternal Grandmother      Unknown/Adopted Maternal Grandfather        Review of Systems   Constitutional: Negative.    HENT: Negative.    Eyes: Negative.    Respiratory: Negative.    Cardiovascular: Negative.    Gastrointestinal: Negative.    Endocrine: Negative.    Genitourinary: Negative.    Musculoskeletal: Negative.    Allergic/Immunologic: Negative.    Neurological: Negative.        /56 (BP Location: Right  "arm, Patient Position: Sitting, Cuff Size: Adult Regular)   Pulse 100   Temp 97.6  F (36.4  C) (Oral)   Resp 20   Ht 1.704 m (5' 7.09\")   Wt 64.4 kg (141 lb 15.6 oz)   SpO2 100%   BMI 22.18 kg/m       Physical Exam  Constitutional:       Appearance: Normal appearance. He is well-developed.   HENT:      Head: Normocephalic and atraumatic.      Right Ear: External ear normal.      Left Ear: External ear normal.      Ears:      Comments: Has a stud on his right ear. A hearing aid on his left.     Nose: Nose normal.      Mouth/Throat:      Mouth: Mucous membranes are moist.      Pharynx: Oropharynx is clear.   Eyes:      Extraocular Movements: Extraocular movements intact.      Pupils: Pupils are equal, round, and reactive to light.      Comments: Wears glasses   Neck:      Musculoskeletal: Normal range of motion.   Cardiovascular:      Rate and Rhythm: Normal rate.   Pulmonary:      Effort: Pulmonary effort is normal.   Abdominal:      General: Abdomen is flat.   Skin:     General: Skin is warm and dry.      Comments: No changes  Cafe au lait macules   Neurological:      General: No focal deficit present.      Mental Status: He is alert and oriented to person, place, and time.   Psychiatric:         Mood and Affect: Mood and affect normal.           Impression:  1. NF1  2. Bilateral ankle valgus, improved with orthotics  3. Behavior issues - some impulsive financial issues    Plan:  1. RTC in 1 year for follow up, or sooner PRN.  2. Consult primary MD re: hypertension and stimulant medication.    Time spent with patient 40 minutes.    This document serves as a record of the services and decisions personally performed and made by Baldomero Hernandez MD. It was created on his behalf by Omar hendricks trained medical scribe. The creation of this document is based on the provider's statements to the medical scribe.    The documentation recorded by the scribe accurately reflects the services I personally " performed and the decisions made by me.    Baldomero Hernandez    CC  Patient Care Team:  Yesica Springer as PCP - General (Internal Medicine)  Susan House, RN as Continuity Care Coordinator (Pediatric Hematology/Oncology)  Baldomero Hernandez MD as Referring Physician (Pediatric Hematology/Oncology)  Daphne Egan APRN CNP as Nurse Practitioner (Pediatric Hematology/Oncology)  Radha Hammond, PhD LP (Psychology)  Schwab, Briana, [place] as Nurse Coordinator  YESICA SPRINGER    Copy to patient  DEBBIE PATINO  [address] [facility] [address]

## 2020-02-04 NOTE — PROGRESS NOTES
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    Since last visit, Guy reported to have been 'fired' from his job at his [employer] after working there for 3 months just yesterday due to taking breaks too often due to school. Does not feel discouraged but rather happy. Plans on getting a new job. 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      Currently, Guy has been doing well. Denies any recent headaches, finger tingling, or any other problems. Hearing aid works well when he wears it, but sometimes he takes them off in daily life. Also reports that his vision is good with no new problems, wears glasses.     Denies N/V/D/C symptoms. Stated having back pain from shoveling snow but has since resolved. Still itches his skin automatically and not from itching. Mother reports that he has scratch marks on his back . Doesn't itch while sleeping however. Reports his blood pressure is high where we discussed its significance with NF1 .     His mother reports that Guy had recently had problems with managing his checkbook and finances. He had spend 200$ on Uber fares. Mother is considering to get Guy to get a driving license.     Weight is maintained. States to be still hungry. Diet is not closely controlled but states to have limit eating junk food. States to eat more fruits than vegetables. However his mother reports that he sometimes has a phase of eating a lot of salad in a week.     Currently is taking 10 mg Metadate to treat his ADHD. Mother reports that Guy had some changes of his behavior from taking the medicine but is not too concerned. She is concerned however about his current behavior.     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 He is in his 3rd year at [employer] . Guy enjoys running and riding his bike for physical activity. The family has recently  gotten an 11 week old causey retriever in addition to their other dog. 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    History was obtained from Guy and adoptive mother.       Allergies   Allergen Reactions     Bacitracin Blisters     Chloral Hydrate Other (See Comments)     Patient becomes aggressive and hallucinations       Current Outpatient Medications   Medication     albuterol (2.5 MG/3ML) 0.083% nebulizer solution     albuterol (PROAIR HFA, PROVENTIL HFA, VENTOLIN HFA) 108 (90 BASE) MCG/ACT inhaler     benzoyl peroxide 5 % LIQD     Cholecalciferol (VITAMIN D PO)     methylphenidate (METADATE CD) 10 MG CR capsule     methylphenidate (RITALIN) 5 MG tablet     methylphenidate (RITALIN) 5 MG tablet     methylphenidate (RITALIN) 5 MG tablet     tretinoin (RETIN-A) 0.025 % cream     No current facility-administered medications for this visit.        Past Medical History:   Diagnosis Date     Asthma      Hearing loss of left ear      Neurofibromatosis (H)        Past Surgical History:   Procedure Laterality Date     BACK SURGERY       ear surgeries      3 tympanomastoidectomies     PHACOEMULSIFICATION CLEAR CORNEA WITH STANDARD INTRAOCULAR LENS IMPLANT Left 6/19/2019    Procedure: LEFT EYE CATARACT EXTRACTION WITH INTRAOCULAR LENS IMPLANT;  Surgeon: Triston Barr MD;  Location: [facility]       Family History   Problem Relation Age of Onset     Unknown/Adopted Mother      Unknown/Adopted Father      Unknown/Adopted Maternal Grandmother      Unknown/Adopted Maternal Grandfather        Review of Systems   Constitutional: Negative.    HENT: Negative.    Eyes: Negative.    Respiratory: Negative.    Cardiovascular: Negative.    Gastrointestinal: Negative.    Endocrine: Negative.    Genitourinary: Negative.    Musculoskeletal: Negative.    Allergic/Immunologic: Negative.    Neurological: Negative.        /56 (BP Location: Right arm, Patient Position: Sitting, Cuff Size: Adult Regular)   Pulse 100   Temp 97.6  F  "(36.4  C) (Oral)   Resp 20   Ht 1.704 m (5' 7.09\")   Wt 64.4 kg (141 lb 15.6 oz)   SpO2 100%   BMI 22.18 kg/m      Physical Exam  Constitutional:       Appearance: Normal appearance. He is well-developed.   HENT:      Head: Normocephalic and atraumatic.      Right Ear: External ear normal.      Left Ear: External ear normal.      Ears:      Comments: Has a stud on his right ear. A hearing aid on his left.     Nose: Nose normal.      Mouth/Throat:      Mouth: Mucous membranes are moist.      Pharynx: Oropharynx is clear.   Eyes:      Extraocular Movements: Extraocular movements intact.      Pupils: Pupils are equal, round, and reactive to light.      Comments: Wears glasses   Neck:      Musculoskeletal: Normal range of motion.   Cardiovascular:      Rate and Rhythm: Normal rate.   Pulmonary:      Effort: Pulmonary effort is normal.   Abdominal:      General: Abdomen is flat.   Skin:     General: Skin is warm and dry.      Comments: No changes  Cafe au lait macules   Neurological:      General: No focal deficit present.      Mental Status: He is alert and oriented to person, place, and time.   Psychiatric:         Mood and Affect: Mood and affect normal.           Impression:  1. NF1  2. Bilateral ankle valgus, improved with orthotics  3. Behavior issues - some impulsive financial issues    Plan:  1. RTC in 1 year for follow up, or sooner PRN.  2. Consult primary MD re: hypertension and stimulant medication.    Time spent with patient 40 minutes.    This document serves as a record of the services and decisions personally performed and made by Baldomero Hernandez MD. It was created on his behalf by Omar hendricks trained medical scribe. The creation of this document is based on the provider's statements to the medical scribe.    The documentation recorded by the scribe accurately reflects the services I personally performed and the decisions made by me.    Baldomero Hernandez    CC  Patient Care " Team:  Yesica Springer as PCP - General (Internal Medicine)  Susan House, RN as Continuity Care Coordinator (Pediatric Hematology/Oncology)  Baldomero Hernandez MD as Referring Physician (Pediatric Hematology/Oncology)  Daphne Egan, APRN CNP as Nurse Practitioner (Pediatric Hematology/Oncology)  Radha Hammond, PhD LP (Psychology)  Schwab, Briana, [place] as Nurse Coordinator  YESICA SPRINGER    Copy to patient  DEBBIE PATINO  [address] [facility] [address]

## 2020-02-04 NOTE — NURSING NOTE
"Chief Complaint   Patient presents with     RECHECK     Patient is here today for NF1 follow up     BP (!) 136/92 (BP Location: Right arm, Patient Position: Fowlers, Cuff Size: Adult Large)   Pulse 100   Temp 97.6  F (36.4  C) (Oral)   Resp 20   Ht 1.704 m (5' 7.09\")   Wt 64.4 kg (141 lb 15.6 oz)   SpO2 100%   BMI 22.18 kg/m      Adrianne Mcdonald LPN LPN    February 4, 2020  "

## 2020-03-02 ENCOUNTER — HEALTH MAINTENANCE LETTER (OUTPATIENT)
Age: 30
End: 2020-03-02

## 2020-09-11 DIAGNOSIS — Q85.01 NEUROFIBROMATOSIS, PERIPHERAL, NF1 (H): Primary | ICD-10-CM

## 2020-09-18 RX ORDER — DIAZEPAM 10 MG
10 TABLET ORAL ONCE
Qty: 1 TABLET | Refills: 0 | Status: SHIPPED | OUTPATIENT
Start: 2020-09-18 | End: 2020-09-18

## 2020-09-21 ENCOUNTER — TELEPHONE (OUTPATIENT)
Dept: PEDIATRIC HEMATOLOGY/ONCOLOGY | Facility: CLINIC | Age: 30
End: 2020-09-21

## 2020-09-21 NOTE — TELEPHONE ENCOUNTER
E-mail from patient/caregiver:  From: Shwetha Thornton <[email or web address]>  Sent: Friday, September 18, 2020 11:24 AM  To: Susan House <[email or web address]>  Subject: Yevgeniy Thornton - Ammon Lackey,    Since I am moving and taking Guy ??, in order to receive funding for him (which is basically food and hotel costs on the way), [facility] needs a letter from his physician stating he s not able to live on his own. (I like to add yet because I really do want him to gain his independence.)  Is that something Dr. Hernandez is willing to write?    My funding is being put through and if Dr. Hernandez is willing, I need the letter as soon as possible. The paperwork has to be done because fiscal year is coming to a close and they have a lot of people to fund.    Thank you!! (Either way...just so I know.)    Shwetha Thornton    Action:  Reviewed neuropsychology evaluation reports and discussed with Dr. Hernandez, who agreed to provide requested letter.  See 'Letters'    Reply to patient/caregiver:  From: Susan House   Sent: Monday, September 21, 2020 12:21 PM  To: Shwetha Thornton <[email or web address]>  Subject: RE: Yevgeniy Thornton - Ammon Tadeo,  Attached is the letter you requested.   The neuropsychology evaluations done here in 2012 and 2017 did not include an assessment of 'adaptive behavior', which may be an important component to include in a future neuropsychological evaluation prior to Yevgeniy living on his own - an adaptive behavior assessment should provide information about a person's functioning in the following domains: motor function/mobility; social, communication/practical cognitive skills; daily living skills; work skills/work-related behaviors.  Thank you,    Darya House, MS, RN  Care Coordinator I Neurofibromatosis Program [email or web address] NF Clinic: [email or web address] I [facility]  [address], 9[address] [place] [address]  Clinic: [phone]  Phone: [phone]  Pager: [phone]  Fax: [phone]  Employed by [facility] [employer]    The information transmitted in this e?mail is intended only for the person or entity to which it is addressed and may contain confidential and/or privileged material, including  protected health information.  If you are not the intended recipient,you are hereby notified that any review, re-transmission, dissemination, distribution, or copying of this message is strictly prohibited. If you have received this communication in error, please destroy and delete this message from any computer and contact us immediately by return e?mail.

## 2020-09-25 ENCOUNTER — HOSPITAL ENCOUNTER (OUTPATIENT)
Dept: MRI IMAGING | Facility: CLINIC | Age: 30
Discharge: HOME | End: 2020-09-25
Attending: NURSE PRACTITIONER | Admitting: NURSE PRACTITIONER
Payer: COMMERCIAL

## 2020-09-25 DIAGNOSIS — Q85.01 NEUROFIBROMATOSIS, PERIPHERAL, NF1 (H): ICD-10-CM

## 2020-09-25 PROCEDURE — 71550 MRI CHEST W/O DYE: CPT

## 2020-09-29 ENCOUNTER — TELEPHONE (OUTPATIENT)
Dept: PEDIATRIC HEMATOLOGY/ONCOLOGY | Facility: CLINIC | Age: 30
End: 2020-09-29

## 2020-09-30 ENCOUNTER — OFFICE VISIT (OUTPATIENT)
Dept: PEDIATRIC HEMATOLOGY/ONCOLOGY | Facility: CLINIC | Age: 30
End: 2020-09-30
Attending: PEDIATRICS
Payer: COMMERCIAL

## 2020-09-30 VITALS
SYSTOLIC BLOOD PRESSURE: 126 MMHG | DIASTOLIC BLOOD PRESSURE: 80 MMHG | HEART RATE: 77 BPM | HEIGHT: 67 IN | WEIGHT: 148.37 LBS | RESPIRATION RATE: 16 BRPM | TEMPERATURE: 98.7 F | OXYGEN SATURATION: 100 % | BODY MASS INDEX: 23.29 KG/M2

## 2020-09-30 DIAGNOSIS — Q85.01 NEUROFIBROMATOSIS, PERIPHERAL, NF1 (H): Primary | ICD-10-CM

## 2020-09-30 PROCEDURE — G0463 HOSPITAL OUTPT CLINIC VISIT: HCPCS | Mod: ZF

## 2020-09-30 RX ORDER — OFLOXACIN 3 MG/ML
SOLUTION AURICULAR (OTIC)
COMMUNITY
Start: 2020-09-22

## 2020-09-30 ASSESSMENT — PAIN SCALES - GENERAL: PAINLEVEL: NO PAIN (0)

## 2020-09-30 ASSESSMENT — MIFFLIN-ST. JEOR: SCORE: 1604.88

## 2020-09-30 NOTE — NURSING NOTE
"Chief Complaint   Patient presents with     RECHECK     Patient is here today for NF1 follow up     /80 (BP Location: Right arm, Patient Position: Fowlers, Cuff Size: Adult Regular)   Pulse 77   Temp 98.7  F (37.1  C) (Oral)   Resp 16   Ht 1.707 m (5' 7.21\")   Wt 67.3 kg (148 lb 5.9 oz)   SpO2 100%   BMI 23.10 kg/m      No Pain (0)  Data Unavailable    I have reviewed the patients medications and allergies    Height/weight double check needed? No     Peds Outpatient BP  1) Rested for 5 minutes, BP taken on bare arm, patient sitting (or supine for infants) w/ legs uncrossed?   Yes  2) Right arm used?  Right arm   Yes  3) Arm circumference of largest part of upper arm (in cm): 28  4) BP cuff sized used: Adult (25-32cm)   If used different size cuff then what was recommended why? N/A  5) Machine BP reading:   BP Readings from Last 1 Encounters:   20 126/80      Is reading >90%?No   (90% for <1 years is 90/50)  (90% for >18 years is 140/90)  *If BP is >90% take manual BP  6) Manual BP readin/80  7) Other comments: None      Adrianne Mcdonald LPN  2020  "

## 2020-09-30 NOTE — PATIENT INSTRUCTIONS
Thank you for choosing [facility]!   It was a pleasure to see you in our Neurofibromatosis Clinic today.  [email or web address]    Here's our recommendations for follow-up care:    Referrals/Tests/Plan:  Transfer care to one of the following clinics:  [facility]  Multidisciplinary Neurofibromatosis Clinic  [address]  [place] [address]  PHONE: [phone]  [email or web address]    [facility] [place] [facility]  Children's Outpatient Center  [address], [address]  [place] [address]  APPOINTMENTS: [phone]  INFORMATION:    [phone]  FAX:                         [phone]  [email or web address]      Other Instructions:    Eye exam every every 1-2 years, or as recommended by your specialist    Blood pressure check at every visit to a medical clinic    Regular follow-up with your Primary Care Provider    Influenza vaccine every year in the fall    Use Broad-Spectrum sunscreen SPF 15-30 from April through September    Call if you develop any of the following:    New or worsening headaches    Vision changes    Elevated blood pressure    Rapidly growing or painful lump    New or worsening pain, numbness, tingling or weakness    New or worsening heartburn, abdominal pain, or change in bowel movements    Any other new or concerning symptom you would like to discuss    ------------------------------------------------------------------------------------------------------------------------------    Neurofibromatosis (NF) Clinic  [facility], 9th Floor - [address] [address]  Fax: [phone]   Scheduling/Appointments: [phone]   Services: [phone]   Infusion Center/Lab: [phone]   Radiology/Imaging:  [phone] (Pediatrics) / [phone] (Adults)  Pediatric Specialty Call Center: [phone]  Adult Specialty Call Center: [phone]   Concerns or questions for your care team:  Monday - Friday, 8:00 am - 5:00 pm:    Non-urgent concerns: Voicemail: [phone]    Urgent concerns: [facility]: [phone]  Nights and weekends:   Call [phone] and ask the  to page the 'Pediatric Hematology/Oncology fellow on call' if you have an urgent concern that can't wait until the clinic opens.    ------------------------------------------------------------------------------------------------------------------------------    Neurofibromatosis Team  Baldomero Hernandez MD - Director, Neurofibromatosis/Pediatric Neuro-Oncology  Carmel Clifton MD - Pediatric Genetics  Daphne Egan, CNP, APRN   Radha Lenz, JAMIE, APRN  Darya House, MS, RN - NF Care Coordinator  Phone: [phone]  E-mail: [email or web address]  Nina Jimenez, RN - Tumor Care Coordinator     Phone: [phone]  Billie Gaines CGC - Genetic Counselor  Phone: [phone]

## 2020-09-30 NOTE — PROGRESS NOTES
"ROCAEL Vuong comes in with his mother today to review his whole body MRI and to prepare for their move to [place] and establishment of NF1 comprehensive care there.  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    The whole body MRI images are reviewed with them        ROS     12 point review of systems within normal limits  Known learning disorder        /80 (BP Location: Right arm, Patient Position: Fowlers, Cuff Size: Adult Regular)   Pulse 77   Temp 98.7  F (37.1  C) (Oral)   Resp 16   Ht 1.707 m (5' 7.21\")   Wt 67.3 kg (148 lb 5.9 oz)   SpO2 100%   BMI 23.10 kg/m        Physical Exam  Constitutional:       General: He is not in acute distress.     Appearance: Normal appearance. He is normal weight.   HENT:      Head: Normocephalic.      Right Ear: External ear normal.      Left Ear: External ear normal.      Nose: Nose normal.   Pulmonary:      Effort: Pulmonary effort is normal. No respiratory distress.   Abdominal:      General: Abdomen is flat. There is no distension.      Tenderness: There is no abdominal tenderness.   Musculoskeletal: Normal range of motion.   Skin:     General: Skin is warm and dry.   Neurological:      General: No focal deficit present.      Mental Status: He is alert and oriented to person, place, and time. Mental status is at baseline.      Cranial Nerves: No cranial nerve deficit.      Motor: No weakness.      Gait: Gait normal.   Psychiatric:         Mood and Affect: Mood normal.         Behavior: Behavior normal.       MR WHOLE BODY W/O CONTRAST 9/25/2020 5:38 PM     CLINICAL HISTORY: NF1 please evaluate neurofibroma burden;  Neurofibromatosis, peripheral, NF1 (H)     COMPARISON: None     FINDINGS: 2.1 cm T2 bright structure in the anterior mediastinum  between the SVC and aortic arch. No other discrete T2 signal  abnormality identified. Iliac veins are prominent. Increased  ill-defined T2 signal in the anterior abdominal wall inferiorly " just  anteriorly and superior to the pubic symphysis.                                                                      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     SHAUN POLLARD MD      Impression:  Whole body MRI reveals two very benign-appearing neurofibromas, o/w nothing of concern.  I believe this represents a person at very low risk for MPNST given the lack of nodular or plexiform neurofibromas  Also note normal BP today  Known learning disorder with recent neuropsych testing    Plan:  Observation only  Counseled re: hypertension and HA  Information provided regarding other comprehensive NF clinics in [place].      I spent a total of 35 minutes face-to-face with Guy Thornton during today's office visit.  Over 50% of this time was spent counseling the patient and/or coordinating care regarding MRI results and continuation of care.  See note for details.      Baldomero Hernandez MD

## 2020-09-30 NOTE — LETTER
"  9/30/2020      RE: Guy Thornton  [address] [place] [address]       HPI     Yevgeniy comes in with his mother today to review his whole body MRI and to prepare for their move to [place] and establishment of NF1 comprehensive care there.  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    The whole body MRI images are reviewed with them        ROS     12 point review of systems within normal limits  Known learning disorder        /80 (BP Location: Right arm, Patient Position: Fowlers, Cuff Size: Adult Regular)   Pulse 77   Temp 98.7  F (37.1  C) (Oral)   Resp 16   Ht 1.707 m (5' 7.21\")   Wt 67.3 kg (148 lb 5.9 oz)   SpO2 100%   BMI 23.10 kg/m        Physical Exam  Constitutional:       General: He is not in acute distress.     Appearance: Normal appearance. He is normal weight.   HENT:      Head: Normocephalic.      Right Ear: External ear normal.      Left Ear: External ear normal.      Nose: Nose normal.   Pulmonary:      Effort: Pulmonary effort is normal. No respiratory distress.   Abdominal:      General: Abdomen is flat. There is no distension.      Tenderness: There is no abdominal tenderness.   Musculoskeletal: Normal range of motion.   Skin:     General: Skin is warm and dry.   Neurological:      General: No focal deficit present.      Mental Status: He is alert and oriented to person, place, and time. Mental status is at baseline.      Cranial Nerves: No cranial nerve deficit.      Motor: No weakness.      Gait: Gait normal.   Psychiatric:         Mood and Affect: Mood normal.         Behavior: Behavior normal.       MR WHOLE BODY W/O CONTRAST 9/25/2020 5:38 PM     CLINICAL HISTORY: NF1 please evaluate neurofibroma burden;  Neurofibromatosis, peripheral, NF1 (H)     COMPARISON: None     FINDINGS: 2.1 cm T2 bright structure in the anterior mediastinum  between the SVC and aortic arch. No other discrete T2 signal  abnormality identified. Iliac veins " are prominent. Increased  ill-defined T2 signal in the anterior abdominal wall inferiorly just  anteriorly and superior to the pubic symphysis.                                                                      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     SHAUN POLLARD MD      Impression:  Whole body MRI reveals two very benign-appearing neurofibromas, o/w nothing of concern.  I believe this represents a person at very low risk for MPNST given the lack of nodular or plexiform neurofibromas  Also note normal BP today  Known learning disorder with recent neuropsych testing    Plan:  Observation only  Counseled re: hypertension and HA  Information provided regarding other comprehensive NF clinics in [place].      I spent a total of 35 minutes face-to-face with Guy Norberto during today's office visit.  Over 50% of this time was spent counseling the patient and/or coordinating care regarding MRI results and continuation of care.  See note for details.      Baldomero Hernandez MD        Patient's mother is transferring to a new job in [place], so they will be moving there at the end of December.    Authorization to Release PHI forms signed by patient to send records & imaging to one of the following NF programs in [place]:    [facility]  Multidisciplinary Neurofibromatosis Clinic  [address]  [place] [address]  PHONE: [phone]  [email or web address]    [facility] - [place]  [facility]  Children's Outpatient Center  [address]  [place] [address]  APPOINTMENTS: [phone]  INFORMATION:    [phone]  FAX:                         [phone]  [email or web address]     Patient and his mother will look into these options and let us know which one to  send records to.  Signed BREN forms to be scanned to chart pending this decision.    Susan House MS, RN  Neurofibromatosis Care Coordinator  Phone: [phone]  Pager: [phone]  Clinic: [phone]      Baldomero Hernandez MD

## 2020-10-01 NOTE — PROGRESS NOTES
Patient's mother is transferring to a new job in [place], [place], so they will be moving there at the end of December.    Authorization to Release PHI forms signed by patient to send records & imaging to one of the following NF programs in [place]:    [facility]  Multidisciplinary Neurofibromatosis Clinic  [address]  [place] [address]  PHONE: [phone]  [email or web address]    [facility] - [facility] [place]  Children's Outpatient Center  [address]  [place] [address]  APPOINTMENTS: [phone]  INFORMATION:    [phone]  FAX:                         [phone]  [email or web address]""     Patient and his mother will look into these options and let us know which one to send records to.  Signed BREN forms to be scanned to chart pending this decision.    Susan House MS, RN  Neurofibromatosis Care Coordinator  Phone: [phone]  Pager: [phone]  Clinic: [phone]

## 2020-12-14 ENCOUNTER — HEALTH MAINTENANCE LETTER (OUTPATIENT)
Age: 30
End: 2020-12-14

## 2021-04-18 ENCOUNTER — HEALTH MAINTENANCE LETTER (OUTPATIENT)
Age: 31
End: 2021-04-18

## 2021-10-02 ENCOUNTER — HEALTH MAINTENANCE LETTER (OUTPATIENT)
Age: 31
End: 2021-10-02

## 2022-03-22 NOTE — LETTER
[facility] PHYSICAL THERAPY  [address]  [facility] [address]  [phone]    2017    Re: Guy Thornton   :   1990  MRN:  [id number]   REFERRING PHYSICIAN:   Baldomero Peoples*    [facility] PHYSICAL THERAPY  Date of Initial Evaluation:  7/10/17  Visits:  Rxs Used: 3  Reason for Referral:     Cervicalgia  Pain in both feet    PROGRESS  REPORT  Progress reporting period is from 17 to 17.       SUBJECTIVE  Patient reports pain has been about 1 / 10 PL. right rotation definitly seemed to help. Patient would like his ankle looked at more today. Patient reports ball of toes hurt when standing for a couple hours.       Current Pain level: 1/10 (ankle / foot pain of 6/10 PL).     Initial Pain level: 8/10.   Changes in function:  Yes (See Goal flowsheet attached for changes in current functional level)    Adverse reaction to treatment or activity: None    OBJECTIVE  Finished evaluation for Ankles. Pes planus bilaterally. claw toes bilaterally in 3rd phalnage. ER 4/5, abduction 4/5, Femoral Internal rotation bilaterally.      ASSESSMENT/PLAN  Updated problem list and treatment plan:     Diagnosis 1:  cerivcal pain  Pain -  hot/cold therapy, manual therapy, self management, education, directional preference exercise and home program  Decreased ROM/flexibility - manual therapy, therapeutic exercise, therapeutic activity and home program  Decreased strength - therapeutic exercise, therapeutic activities and home program  Decreased function - therapeutic activities and home program  Impaired posture - neuro re-education, therapeutic activities and home program    Diagnosis 2:  Bilateral great toe pain / ankle postural dysfunction   Pain -  manual therapy, self management, education, directional preference exercise and home program  Decreased strength - therapeutic exercise, therapeutic activities and home program  Decreased function -  therapeutic activities and home program  Impaired posture - neuro re-education, therapeutic activities and home program    STG/LTGs have been met or progress has been made towards goals:  Yes (See Goal flow sheet completed today.)    Assessment of Progress: The patient's condition is improving.    Self Management Plans:  Patient is independent in a home treatment program.    I have re-evaluated this patient and find that the nature, scope, duration and intensity of the therapy is appropriate for the medical condition of the patient.    Guy continues to require the following intervention to meet STG and LTG's:  PT    Recommendations:  This patient would benefit from continued therapy.     Frequency:  1 X week, once daily  Duration:  for 4 weeks    Thank you for your referral.    INQUIRIES  Therapist: Mulugeta King DPT  [facility] PHYSICAL THERAPY  [address]  [facility] [address]  Phone: [phone]  Fax: [phone]      Bcc Histology Text: There were numerous aggregates of basaloid cells.

## 2022-05-14 ENCOUNTER — HEALTH MAINTENANCE LETTER (OUTPATIENT)
Age: 32
End: 2022-05-14

## 2022-09-03 ENCOUNTER — HEALTH MAINTENANCE LETTER (OUTPATIENT)
Age: 32
End: 2022-09-03

## 2023-06-03 ENCOUNTER — HEALTH MAINTENANCE LETTER (OUTPATIENT)
Age: 33
End: 2023-06-03

## 2023-08-08 NOTE — PROGRESS NOTES
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 Since his last visit, Guy reports he has been doing well. He notes that he has been experiencing posterior neck pain, which is provoked by bending his head downward. He states that he may have tweaked it, and his neck intermittently \"pops\" when turning his head. He has been taking ibuprofen for his neck pain with minimal relief. In addition he continues to experience intermittent low back pain associated to spine sway as well as bilateral foot pain. Guy has not used orthotics since he was a baby. He denies noticing any other associated symptoms or complaints.      Fam/Soc: Guy attends [employer]. He enjoys running.     History was obtained from Guy and his mother.        Allergies   Allergen Reactions     Bacitracin Blisters     Chloral Hydrate Other (See Comments)     Patient becomes aggressive and hallucinations       Current Outpatient Prescriptions   Medication     methylphenidate (RITALIN) 5 MG tablet     albuterol (2.5 MG/3ML) 0.083% nebulizer solution     albuterol (PROAIR HFA, PROVENTIL HFA, VENTOLIN HFA) 108 (90 BASE) MCG/ACT inhaler     methylphenidate (RITALIN) 5 MG tablet     methylphenidate (RITALIN) 5 MG tablet     Cholecalciferol (VITAMIN D PO)     No current facility-administered medications for this visit.        Past Medical History:   Diagnosis Date     Asthma      Hearing loss of left ear      Neurofibromatosis (H)        Past Surgical History:   Procedure Laterality Date     BACK SURGERY       ear surgeries      3 tympanomastoidectomies       Family History   Problem Relation Age of Onset     Unknown/Adopted Mother      Unknown/Adopted Father      Unknown/Adopted Maternal Grandmother      Unknown/Adopted Maternal Grandfather        Review of Systems   Constitutional: Negative.  " SW located 2 different agencies, with multiple locations near Justice's home, that should accept [facility] and offer behavioral health services.    [facility] [phone] (locations in [place] [facility])    [facility] [phone]     left for Justice to discuss.    Deidre Lopez, MSW, LCSW, CCTSW-MCS  Outpatient Pediatric Cardiac and Pediatric ED       "  HENT: Negative.    Eyes: Negative.         Left cataract    Respiratory: Negative.    Cardiovascular: Negative.    Gastrointestinal: Negative.    Genitourinary: Negative.    Musculoskeletal: Positive for back pain and neck pain.        Bilateral foot pain    Skin: Negative.    Neurological: Negative.    All other systems reviewed and are negative.      /75 (BP Location: Left arm, Patient Position: Fowlers, Cuff Size: Adult Regular)  Pulse 64  Temp 98.5  F (36.9  C) (Oral)  Resp 20  Ht 1.708 m (5' 7.24\")  Wt 68.3 kg (150 lb 9.2 oz)  SpO2 100%  BMI 23.41 kg/m2    Physical Exam   Constitutional: He is oriented to person, place, and time and well-developed, well-nourished, and in no distress.   HENT:   Head: Normocephalic and atraumatic.   Right Ear: External ear normal.   Left Ear: External ear normal.   Mouth/Throat: Oropharynx is clear and moist.   Eyes: Conjunctivae and EOM are normal. Pupils are equal, round, and reactive to light.   Neck: Normal range of motion. Neck supple.   Cardiovascular: Normal rate, regular rhythm and normal heart sounds.    Pulmonary/Chest: Effort normal and breath sounds normal.   Abdominal: Soft. Bowel sounds are normal.   Musculoskeletal: Normal range of motion.   Bilateral ankle valgus, left is worse than right   Neurological: He is alert and oriented to person, place, and time. GCS score is 15.   Skin: Skin is warm and dry.             Results for orders placed or performed in visit on 06/01/16   XR Spine Complete 2 Views    Narrative    Exam: Complete spine radiograph on 6/1/2016.    History: Rule out scoliosis, NF1.    Comparison: 9/21/2012.    Findings: AP and lateral views of the entire spine were obtained.    There is subtle S. Shaped scoliosis of the thoracic and lumbar spine,  levocurvature in the thoracic spine and dextrocurvature in the lumbar  spine measuring less than 10 degrees. There is mild pelvic tilt with  the right iliac crest slightly higher than the " left. There is minimal  rightward coronal imbalance. There is is mild negative sagittal  imbalance. No vertebral body anomaly is identified. Straightening of  the cervical lordosis, which may be positional. Normal thoracic  kyphosis and mild exaggerated lumbar lordosis.     Cardiac silhouette and pulmonary vasculature are within normal limits.  No pneumothorax, pleural effusion or focal airspace opacities.  Nonobstructive bowel gas pattern.      Impression    Impression:  Mild S. shaped scoliotic curvature of the thoracolumbar  spine, similar to prior. Negative sagittal imbalance, slightly  progressed since prior study. Minimal pelvic tilt, not significant  changed.    I have personally reviewed the examination and initial interpretation  and I agree with the findings.    BIANCA CHRISTIANSON MD     MR CERVICAL SPINE W/O & W CONTRAST, MR BRACHIAL PLEXUS W/O  & W CONTRAST 6/28/2017 7:39 AM     Provided History: evaluate c-spine for new &/or progressive  neurofibromas, Neurofibromatosis, type 1     Comparison: No similar prior studies     Technique:   Cervical: Sagittal T1-weighted, sagittal T2-weighted, sagittal  diffusion weighted, axial T2-weighted, and axial T2* gradient echo  images of the cervical spine were obtained without intravenous  contrast. Following intravenous administration of gadolinium, axial  and sagittal T1-weighted images with fat saturation were also  obtained.  Brachial plexus:Axial fat saturated T2-weighted, sagittal T1-weighted,  oblique coronal T1- and fat saturated T2-weighted images of the  brachial plexus bilaterally obtained without intravenous contrast.  After intravenous gadolinium administration, fat saturated sagital,  coronal and axial T1-weighted images were obtained.     Contrast: Outside cervical MR 2/23/2007     Findings:  Cervical: The cervical vertebrae appear normally aligned.   Straightening of normal cervical lordosis. There is no disc height  narrowing at any level.  There is normal  signal within and normal  contour of the cervical spinal cord.  There is no abnormal contrast  enhancement within the cervical spinal cord, thecal sac or vertebral  column.       Tiny left central inferiorly migrating extrusion with effacement of  the ventral subarachnoid space. No spinal canal or neural foraminal  stenosis.     No abnormality of the paraspinous soft tissues.     Brachial plexus: There are several T2 hyperintense, enhancing lesions  in bilateral anterior chest and level 4 reaching up to 1.5 cm in the  right chest. The largest lesion in the right appears to abut the  brachial plexus. These could represent lymph node versus  neurofibromas.     The roots appear normal along their course. No definite mass or lesion  is noted in the adjacent lung apices.     Post-intravenous contrast images also demonstrate no abnormality of  the brachial plexus.         Impression:   1. Cervical spine: No abnormal enhancement in the spinal cord, thecal  sac or cervical vertebrae. No definite neurofibroma. No spinal canal  or neural foraminal stenosis.  2. Brachial plexus: No definite mass in the brachial plexus on either  side. Enhancing lesions in bilateral anterior chest and bilateral  level 4, could represent axillary lymph nodes versus neurofibromas.  The largest lesion on the right appears to abut the brachial plexus.     I have personally reviewed the examination and initial interpretation  and I agree with the findings.     ARMIDA SCHULTZ MD      Impression:  1. NF1  2. Bilateral ankle valgus  3. Left cataract - not NF1 related  4. Neck pain unrelated to NF1  5. MR brachial plexus and cervical spine- no significant pathology   6. Neck pain related to mild disc protrusion     Plan:  1. Recommend orthotics for bilateral ankle valgus   2. Follow up with Physical Therapy for neck pain   3. RTC in 6 months to assess the effectiveness of today's interventions, or sooner PRN       Time spent with patient 25  minutes. Over 50% of the visit was spent counseling the patient on his MR results and treatment plan      This document serves as a record of the services and decisions personally performed and made by Baldomero Hernandez MD. It was created on his behalf by Jasmin Pruitt, a trained medical scribe. The creation of this document is based on the provider's statements to the medical scribe.    The documentation recorded by the scribe accurately reflects the services I personally performed and the decisions made by me.      Baldomero Hernandez      Patient Care Team:  Sonny Lr as PCP - General (Internal Medicine)  Susan House, RN as Continuity Care Coordinator (Pediatric Hematology/Oncology)  Baldomero Hernandez MD as Referring Physician (Pediatric Hematology/Oncology)  Daphne Egan APRN CNP as Nurse Practitioner (Pediatric Hematology/Oncology)  Radha Hammond, PhD LP (Psychology)  DUSTIN MCKNIGHT    Copy to patient  DEBBIE CLARKKENNA  [address]  [id number]  [place] [facility] [address]

## (undated) DEVICE — TAPE MICROPORE 2"X1.5YD 1530S-2

## (undated) DEVICE — GLOVE PROTEXIS MICRO 8.0  2D73PM80

## (undated) DEVICE — SU ETHILON 10-0 CS160-6 12" 9000G

## (undated) DEVICE — EYE PACK CUSTOM ANTERIOR 30DEG TIP CENTURION PPK6682-04

## (undated) DEVICE — PACK CATARACT CUSTOM SO DALE SEY32CTFCX

## (undated) DEVICE — EYE TIP IRRIGATION & ASPIRATION POLYMER 35D BENT 8065751511

## (undated) DEVICE — EYE SHIELD PLASTIC

## (undated) DEVICE — EYE KNIFE SLIT XSTAR VISITEC 2.5MM 45DEG BEVEL UP 373725

## (undated) DEVICE — LINEN TOWEL PACK X5 5464

## (undated) DEVICE — GLOVE PROTEXIS MICRO 7.0  2D73PM70

## (undated) DEVICE — EYE SOL BSS 15ML BOTTLE 65079515

## (undated) DEVICE — EYE SOL BSS 500ML

## (undated) DEVICE — EYE PACK BVI READYPAK KIT #3

## (undated) DEVICE — Device

## (undated) RX ORDER — BRIMONIDINE TARTRATE 2 MG/ML
SOLUTION/ DROPS OPHTHALMIC
Status: DISPENSED
Start: 2019-06-19

## (undated) RX ORDER — FENTANYL CITRATE 50 UG/ML
INJECTION, SOLUTION INTRAMUSCULAR; INTRAVENOUS
Status: DISPENSED
Start: 2019-06-19

## (undated) RX ORDER — PROPOFOL 10 MG/ML
INJECTION, EMULSION INTRAVENOUS
Status: DISPENSED
Start: 2019-06-19

## (undated) RX ORDER — MOXIFLOXACIN 5 MG/ML
SOLUTION/ DROPS OPHTHALMIC
Status: DISPENSED
Start: 2019-06-19

## (undated) RX ORDER — PHENYLEPHRINE HYDROCHLORIDE 25 MG/ML
SOLUTION/ DROPS OPHTHALMIC
Status: DISPENSED
Start: 2019-06-19

## (undated) RX ORDER — DICLOFENAC SODIUM 1 MG/ML
SOLUTION/ DROPS OPHTHALMIC
Status: DISPENSED
Start: 2019-06-19

## (undated) RX ORDER — LIDOCAINE HYDROCHLORIDE 10 MG/ML
INJECTION, SOLUTION EPIDURAL; INFILTRATION; INTRACAUDAL; PERINEURAL
Status: DISPENSED
Start: 2019-06-19

## (undated) RX ORDER — PROPARACAINE HYDROCHLORIDE 5 MG/ML
SOLUTION/ DROPS OPHTHALMIC
Status: DISPENSED
Start: 2019-06-19

## (undated) RX ORDER — TROPICAMIDE 10 MG/ML
SOLUTION/ DROPS OPHTHALMIC
Status: DISPENSED
Start: 2019-06-19